# Patient Record
Sex: MALE | Race: BLACK OR AFRICAN AMERICAN | NOT HISPANIC OR LATINO | Employment: FULL TIME | URBAN - METROPOLITAN AREA
[De-identification: names, ages, dates, MRNs, and addresses within clinical notes are randomized per-mention and may not be internally consistent; named-entity substitution may affect disease eponyms.]

---
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
## 2022-07-26 ENCOUNTER — HOSPITAL ENCOUNTER (EMERGENCY)
Facility: CLINIC | Age: 22
Discharge: HOME | End: 2022-07-26
Attending: EMERGENCY MEDICINE | Admitting: EMERGENCY MEDICINE
Payer: COMMERCIAL

## 2022-07-26 VITALS
BODY MASS INDEX: 21.97 KG/M2 | RESPIRATION RATE: 18 BRPM | OXYGEN SATURATION: 100 % | DIASTOLIC BLOOD PRESSURE: 54 MMHG | SYSTOLIC BLOOD PRESSURE: 111 MMHG | HEART RATE: 116 BPM | HEIGHT: 73 IN | TEMPERATURE: 100.8 F

## 2022-07-26 DIAGNOSIS — U07.1 INFECTION DUE TO 2019 NOVEL CORONAVIRUS: ICD-10-CM

## 2022-07-26 LAB
ANION GAP SERPL CALCULATED.3IONS-SCNC: 9 MMOL/L (ref 3–14)
BUN SERPL-MCNC: 8 MG/DL (ref 7–30)
CALCIUM SERPL-MCNC: 9.5 MG/DL (ref 8.5–10.1)
CHLORIDE BLD-SCNC: 101 MMOL/L (ref 94–109)
CO2 SERPL-SCNC: 27 MMOL/L (ref 20–32)
CREAT SERPL-MCNC: 0.88 MG/DL (ref 0.66–1.25)
ERYTHROCYTE [DISTWIDTH] IN BLOOD BY AUTOMATED COUNT: 12.3 % (ref 10–15)
GFR SERPL CREATININE-BSD FRML MDRD: >90 ML/MIN/1.73M2
GLUCOSE BLD-MCNC: 103 MG/DL (ref 70–99)
HCT VFR BLD AUTO: 46.9 % (ref 40–53)
HGB BLD-MCNC: 15.5 G/DL (ref 13.3–17.7)
MCH RBC QN AUTO: 29.9 PG (ref 26.5–33)
MCHC RBC AUTO-ENTMCNC: 33 G/DL (ref 31.5–36.5)
MCV RBC AUTO: 91 FL (ref 78–100)
PLATELET # BLD AUTO: 205 10E3/UL (ref 150–450)
POTASSIUM BLD-SCNC: 3.8 MMOL/L (ref 3.4–5.3)
RBC # BLD AUTO: 5.18 10E6/UL (ref 4.4–5.9)
SODIUM SERPL-SCNC: 137 MMOL/L (ref 133–144)
WBC # BLD AUTO: 11.8 10E3/UL (ref 4–11)

## 2022-07-26 PROCEDURE — 85027 COMPLETE CBC AUTOMATED: CPT | Performed by: EMERGENCY MEDICINE

## 2022-07-26 PROCEDURE — 99284 EMERGENCY DEPT VISIT MOD MDM: CPT | Mod: 25

## 2022-07-26 PROCEDURE — 96360 HYDRATION IV INFUSION INIT: CPT

## 2022-07-26 PROCEDURE — 36415 COLL VENOUS BLD VENIPUNCTURE: CPT | Performed by: EMERGENCY MEDICINE

## 2022-07-26 PROCEDURE — 250N000013 HC RX MED GY IP 250 OP 250 PS 637: Performed by: EMERGENCY MEDICINE

## 2022-07-26 PROCEDURE — 258N000003 HC RX IP 258 OP 636: Performed by: EMERGENCY MEDICINE

## 2022-07-26 PROCEDURE — 96361 HYDRATE IV INFUSION ADD-ON: CPT

## 2022-07-26 PROCEDURE — 80048 BASIC METABOLIC PNL TOTAL CA: CPT | Performed by: EMERGENCY MEDICINE

## 2022-07-26 RX ORDER — ACETAMINOPHEN 325 MG/1
975 TABLET ORAL ONCE
Status: DISCONTINUED | OUTPATIENT
Start: 2022-07-26 | End: 2022-07-26

## 2022-07-26 RX ORDER — IBUPROFEN 100 MG/5ML
600 SUSPENSION, ORAL (FINAL DOSE FORM) ORAL ONCE
Status: COMPLETED | OUTPATIENT
Start: 2022-07-26 | End: 2022-07-26

## 2022-07-26 RX ORDER — IBUPROFEN 600 MG/1
600 TABLET, FILM COATED ORAL ONCE
Status: DISCONTINUED | OUTPATIENT
Start: 2022-07-26 | End: 2022-07-26

## 2022-07-26 RX ADMIN — SODIUM CHLORIDE 1000 ML: 9 INJECTION, SOLUTION INTRAVENOUS at 18:53

## 2022-07-26 RX ADMIN — ACETAMINOPHEN 1000 MG: 325 SUSPENSION ORAL at 19:29

## 2022-07-26 RX ADMIN — IBUPROFEN 600 MG: 200 SUSPENSION ORAL at 19:30

## 2022-07-26 NOTE — LETTER
[facility] EMERGENCY DEPT  [address] [facility] [address]  [phone]      2022    Ace Chavira  [address] [id number] [phone] (home)     : 2000      To Whom it may concern:    Ace Chavira was seen in our Emergency Department today, 2022 for an in evaluation.  He tested positive for COVID and will have to isolate at home for 10 days starting from 22.    Sincerely,  ____________________  Magan Crespo MD

## 2022-07-26 NOTE — ED TRIAGE NOTES
Pt tested positive for covid today. PT reports headache, back pain, and itching in throat.     Triage Assessment     Row Name 07/26/22 7957       Triage Assessment (Adult)    Airway WDL WDL       Respiratory WDL    Respiratory WDL --  Denies Cp       Cardiac WDL    Cardiac WDL WDL       Peripheral/Neurovascular WDL    Peripheral Neurovascular WDL WDL       Cognitive/Neuro/Behavioral WDL    Cognitive/Neuro/Behavioral WDL WDL

## 2022-07-26 NOTE — ED PROVIDER NOTES
"  History     Chief Complaint:  Covid Concern       HPI   Ace Chavira is a 22 year old male no reported past medical history presenting for evaluation of 1 day sore throat and a positive home COVID test.  He reports body aches, mild headache but no chest pain, chest pressure or shortness of breath.  He works as a .    ROS:  Review of Systems  10 point ROS completed, negative except as indicated in the HPI.       Allergies:  No Known Allergies     Medications:    None reported    Past Medical History:    None reported    Past Surgical History:    None reported       Family History:    None reported      Social History:  Works as   [facility]     Physical Exam   Patient Vitals for the past 24 hrs:   BP Temp Temp src Pulse Resp SpO2 Height   07/26/22 1841 111/54 (!) 100.8  F (38.2  C) Temporal 116 18 100 % 1.854 m (6' 1\")        Physical Exam  Vital Signs :/54   Pulse 116   Temp (!) 100.8  F (38.2  C) (Temporal)   Resp 18   Ht 1.854 m (6' 1\")   SpO2 100%   BMI 21.97 kg/m     Constitutional: Alert, attentive, GCS 15   Eyes: EOM are normal, anicteric, conjugate gaze  CV: distal extremities warm, well perfused  Chest: Clear to auscultation bilaterally non-labored breathing on RA  GI:  non tender. No distension. No guarding or rebound.    Neurological: Alert, attentive, moving all extremities equally.   Skin: Skin is warm and dry.             Emergency Department Course   Laboratory:  Labs Ordered and Resulted from Time of ED Arrival to Time of ED Departure   CBC WITH PLATELETS - Abnormal       Result Value    WBC Count 11.8 (*)     RBC Count 5.18      Hemoglobin 15.5      Hematocrit 46.9      MCV 91      MCH 29.9      MCHC 33.0      RDW 12.3      Platelet Count 205     BASIC METABOLIC PANEL - Abnormal    Sodium 137      Potassium 3.8      Chloride 101      Carbon Dioxide (CO2) 27      Anion Gap 9      Urea Nitrogen 8      Creatinine 0.88      Calcium 9.5      " Glucose 103 (*)     GFR Estimate >90            Emergency Department Course:  Reviewed:  I reviewed nursing notes, vitals and past medical history    Interventions:  Medications   0.9% sodium chloride BOLUS (0 mLs Intravenous Stopped 22)   ibuprofen (ADVIL/MOTRIN) suspension 600 mg (600 mg Oral Given 22)   acetaminophen (TYLENOL) solution 1,000 mg (1,000 mg Oral Given 22)        Disposition:  The patient was discharged to home.     Impression & Plan      Medical Decision Makin-year-old male no significant past medical history though he reports an intolerance to swallowing pills presenting for 1 day of sore throat, myalgias, mild headache and a positive home COVID test.  He has not tried any medications due to is concerned about swallowing pills not due to a sore throat but due to inability to tolerate pills.  His exam and history are consistent with viral illness, given his home positive COVID test, do not see indication for PCR testing as he is not a candidate for monoclonal antibodies or paxlovid.  He was given IV fluids, liquid Tylenol and ibuprofen with significant improvement in his discomfort.  He has no breathlessness, sats of 100% on room air, no indication for chest imaging.  I recommended altering dose of Tylenol, ibuprofen at home be they packets or liquid.  Return precautions reviewed and she was discharged home.      Diagnosis:    ICD-10-CM    1. Infection due to 2019 novel coronavirus  U07.1         Magan Crespo MD  [employer]  12:04 AM 22        Magan Crespo MD  22 [phone]

## 2022-07-27 NOTE — DISCHARGE INSTRUCTIONS

## 2023-04-02 ENCOUNTER — HOSPITAL ENCOUNTER (EMERGENCY)
Facility: CLINIC | Age: 23
Discharge: HOME | End: 2023-04-02
Payer: COMMERCIAL

## 2023-04-02 ENCOUNTER — APPOINTMENT (OUTPATIENT)
Dept: GENERAL RADIOLOGY | Facility: CLINIC | Age: 23
End: 2023-04-02
Payer: COMMERCIAL

## 2023-04-02 VITALS
OXYGEN SATURATION: 100 % | HEART RATE: 70 BPM | WEIGHT: 160 LBS | SYSTOLIC BLOOD PRESSURE: 113 MMHG | BODY MASS INDEX: 21.11 KG/M2 | RESPIRATION RATE: 18 BRPM | TEMPERATURE: 98.2 F | DIASTOLIC BLOOD PRESSURE: 62 MMHG

## 2023-04-02 DIAGNOSIS — R07.9 CHEST PAIN, UNSPECIFIED TYPE: ICD-10-CM

## 2023-04-02 LAB
ANION GAP SERPL CALCULATED.3IONS-SCNC: 10 MMOL/L (ref 7–15)
ATRIAL RATE - MUSE: 71 BPM
BASOPHILS # BLD MANUAL: 0.2 10E3/UL (ref 0–0.2)
BASOPHILS NFR BLD MANUAL: 3 %
BUN SERPL-MCNC: 10.3 MG/DL (ref 6–20)
CALCIUM SERPL-MCNC: 9.3 MG/DL (ref 8.6–10)
CHLORIDE SERPL-SCNC: 105 MMOL/L (ref 98–107)
CREAT SERPL-MCNC: 0.97 MG/DL (ref 0.67–1.17)
DEPRECATED HCO3 PLAS-SCNC: 26 MMOL/L (ref 22–29)
DIASTOLIC BLOOD PRESSURE - MUSE: NORMAL MMHG
EOSINOPHIL # BLD MANUAL: 0.6 10E3/UL (ref 0–0.7)
EOSINOPHIL NFR BLD MANUAL: 7 %
ERYTHROCYTE [DISTWIDTH] IN BLOOD BY AUTOMATED COUNT: 12 % (ref 10–15)
GFR SERPL CREATININE-BSD FRML MDRD: >90 ML/MIN/1.73M2
GLUCOSE SERPL-MCNC: 102 MG/DL (ref 70–99)
HCT VFR BLD AUTO: 46.6 % (ref 40–53)
HGB BLD-MCNC: 15.6 G/DL (ref 13.3–17.7)
INTERPRETATION ECG - MUSE: NORMAL
LYMPHOCYTES # BLD MANUAL: 2.3 10E3/UL (ref 0.8–5.3)
LYMPHOCYTES NFR BLD MANUAL: 29 %
MCH RBC QN AUTO: 29.9 PG (ref 26.5–33)
MCHC RBC AUTO-ENTMCNC: 33.5 G/DL (ref 31.5–36.5)
MCV RBC AUTO: 89 FL (ref 78–100)
MONOCYTES # BLD MANUAL: 0.6 10E3/UL (ref 0–1.3)
MONOCYTES NFR BLD MANUAL: 7 %
NEUTROPHILS # BLD MANUAL: 4.3 10E3/UL (ref 1.6–8.3)
NEUTROPHILS NFR BLD MANUAL: 54 %
P AXIS - MUSE: 61 DEGREES
PLAT MORPH BLD: ABNORMAL
PLATELET # BLD AUTO: 286 10E3/UL (ref 150–450)
POTASSIUM SERPL-SCNC: 3.8 MMOL/L (ref 3.4–5.3)
PR INTERVAL - MUSE: 152 MS
QRS DURATION - MUSE: 74 MS
QT - MUSE: 364 MS
QTC - MUSE: 395 MS
R AXIS - MUSE: 33 DEGREES
RBC # BLD AUTO: 5.22 10E6/UL (ref 4.4–5.9)
RBC MORPH BLD: ABNORMAL
SODIUM SERPL-SCNC: 141 MMOL/L (ref 136–145)
SYSTOLIC BLOOD PRESSURE - MUSE: NORMAL MMHG
T AXIS - MUSE: -9 DEGREES
TROPONIN T SERPL HS-MCNC: 7 NG/L
TROPONIN T SERPL HS-MCNC: 8 NG/L
VARIANT LYMPHS BLD QL SMEAR: PRESENT
VENTRICULAR RATE- MUSE: 71 BPM
WBC # BLD AUTO: 7.9 10E3/UL (ref 4–11)

## 2023-04-02 PROCEDURE — 80048 BASIC METABOLIC PNL TOTAL CA: CPT

## 2023-04-02 PROCEDURE — 71046 X-RAY EXAM CHEST 2 VIEWS: CPT

## 2023-04-02 PROCEDURE — 250N000009 HC RX 250: Performed by: EMERGENCY MEDICINE

## 2023-04-02 PROCEDURE — 250N000013 HC RX MED GY IP 250 OP 250 PS 637: Performed by: EMERGENCY MEDICINE

## 2023-04-02 PROCEDURE — 99285 EMERGENCY DEPT VISIT HI MDM: CPT | Mod: 25

## 2023-04-02 PROCEDURE — 84484 ASSAY OF TROPONIN QUANT: CPT | Mod: 91

## 2023-04-02 PROCEDURE — 36415 COLL VENOUS BLD VENIPUNCTURE: CPT

## 2023-04-02 PROCEDURE — 85007 BL SMEAR W/DIFF WBC COUNT: CPT | Performed by: EMERGENCY MEDICINE

## 2023-04-02 PROCEDURE — 93005 ELECTROCARDIOGRAM TRACING: CPT

## 2023-04-02 PROCEDURE — 84484 ASSAY OF TROPONIN QUANT: CPT | Performed by: EMERGENCY MEDICINE

## 2023-04-02 PROCEDURE — 36415 COLL VENOUS BLD VENIPUNCTURE: CPT | Performed by: EMERGENCY MEDICINE

## 2023-04-02 PROCEDURE — 85027 COMPLETE CBC AUTOMATED: CPT | Performed by: EMERGENCY MEDICINE

## 2023-04-02 PROCEDURE — 80048 BASIC METABOLIC PNL TOTAL CA: CPT | Performed by: EMERGENCY MEDICINE

## 2023-04-02 PROCEDURE — 85027 COMPLETE CBC AUTOMATED: CPT

## 2023-04-02 PROCEDURE — 84484 ASSAY OF TROPONIN QUANT: CPT

## 2023-04-02 PROCEDURE — 85007 BL SMEAR W/DIFF WBC COUNT: CPT

## 2023-04-02 RX ADMIN — LIDOCAINE HYDROCHLORIDE 30 ML: 20 SOLUTION ORAL; TOPICAL at 12:21

## 2023-04-02 ASSESSMENT — ENCOUNTER SYMPTOMS
ARTHRALGIAS: 0
SORE THROAT: 1
DIAPHORESIS: 0
NECK PAIN: 0
SHORTNESS OF BREATH: 0
NAUSEA: 0
BACK PAIN: 0
FEVER: 0
COUGH: 0
HEADACHES: 0
ABDOMINAL PAIN: 0
VOMITING: 0

## 2023-04-02 ASSESSMENT — ACTIVITIES OF DAILY LIVING (ADL): ADLS_ACUITY_SCORE: 35

## 2023-04-02 NOTE — ED PROVIDER NOTES
"    History     Chief Complaint:  Chest Pain       HPI   Ace Chavira is an otherwise healthy 22 year old male with a history of acid reflux who presents with chest pain. Earlier this morning at approximately 10am, he woke up with chest pressure that radiated to his his throat. The pain has been intermittent since then, and each episode lasts about 40 to 45 seconds. Also, it worsens whenever he lays on his side. He denies that the pain not radiated to his back, shoulders, or arms. He longer has any pain at bedside after taking a GI cocktail, but his neck \"now feels hot\". He has been dealing with some acid reflux lately, and has been taking an unspecified over-the-counter acid  for it. He has not taken this medication yet today. He does not have a history of blood clots or anxiety. He does not have any pertinent past family history. He does not currently take any prescription medications. He denies recent long distance travel, surgeries, or any other prolonged periods of immobilization. He recently quit smoking several months ago, but denies alcohol or drug use. He further denies abdominal pain, nausea, vomiting, diaphoresis, shortness of breath, cough, fevers, leg swelling, headaches, neck pain, or any other cold symptoms.  No recent trauma or new work outs he can recall that would cause discomfort.    Independent Historian: None     Review of External Notes: I reviewed the patients past note from 7/26/2022 where he tested positive for Covid-19. No other ED or Urgent care visit noted since then.     ROS:  Review of Systems   Constitutional: Negative for diaphoresis and fever.   HENT: Positive for sore throat.    Respiratory: Negative for cough and shortness of breath.    Cardiovascular: Positive for chest pain. Negative for leg swelling.   Gastrointestinal: Negative for abdominal pain, nausea and vomiting.   Musculoskeletal: Negative for arthralgias, back pain and neck pain.   Neurological: Negative for " headaches.   All other systems reviewed and are negative.    Allergies:  No Known Allergies     Medications:    The patient denies current use of medications.     Past Medical History:    The patient denies pertinent past medical history.    Social History:  Denies smoking or smokeless tobacco use   PCP: [facility]  The patient presents to the ED alone via private vehicle       Physical Exam     Patient Vitals for the past 24 hrs:   BP Temp Temp src Pulse Resp SpO2 Weight   04/02/23 1210 113/62 98.2  F (36.8  C) Temporal 70 18 100 % 72.6 kg (160 lb)        Physical Exam  General: Nontoxic appearing young adult male sitting on gurney.  HENT:   Head: Atraumatic.  Mouth/Throat: Oropharynx clear and moist. Uvula midline. No tonsillary exudate or swelling.  Eyes: Conjunctive and EOM normal. PERRLA.  Neck: Normal ROM. No rigidity.  CV: Regular rate and rhythm. Normal S1, S2. No appreciable murmurs. No reproducible tenderness.  Resp: Lungs clear to auscultation bilaterally. Normal respiratory effort. No stridor or cough observed.  GI:  Abdomen soft, non distended and nontender. No rebound or guarding.  MSK: Normal range of motion.  Skin: Warm and dry.  Neuro: Awake, alert, oriented x 3.  Psych: Normal mood and affect.    Emergency Department Course   ECG  ECG taken at 1205, ECG read at 1210  Sinus rhythm with marked sinus arrhythmia   Nonspecific T wave abnormality   Abnormal ECG   Rate 71 bpm. PA interval 152 ms. QRS duration 74 ms. QT/QTc 364/395 ms. P-R-T axes 61 33 -9.    Imaging:  Chest XR,  PA & LAT  Final Result  IMPRESSION: Negative chest.    Report per radiology    Laboratory:  Labs Ordered and Resulted from Time of ED Arrival to Time of ED Departure   BASIC METABOLIC PANEL - Abnormal       Result Value    Sodium 141      Potassium 3.8      Chloride 105      Carbon Dioxide (CO2) 26      Anion Gap 10      Urea Nitrogen 10.3      Creatinine 0.97      Calcium 9.3      Glucose 102 (*)     GFR  Estimate >90     DIFFERENTIAL - Abnormal    % Neutrophils 54      % Lymphocytes 29      % Monocytes 7      % Eosinophils 7      % Basophils 3      Absolute Neutrophils 4.3      Absolute Lymphocytes 2.3      Absolute Monocytes 0.6      Absolute Eosinophils 0.6      Absolute Basophils 0.2      RBC Morphology Confirmed RBC Indices      Platelet Assessment        Value: Automated Count Confirmed. Platelet morphology is normal.    Reactive Lymphocytes Present (*)    TROPONIN T, HIGH SENSITIVITY - Normal    Troponin T, High Sensitivity 8     CBC WITH PLATELETS AND DIFFERENTIAL - Normal    WBC Count 7.9      RBC Count 5.22      Hemoglobin 15.6      Hematocrit 46.6      MCV 89      MCH 29.9      MCHC 33.5      RDW 12.0      Platelet Count 286     TROPONIN T, HIGH SENSITIVITY - Normal    Troponin T, High Sensitivity 7        Emergency Department Course & Assessments:    Interventions:  1221 GI Cocktail 15 ml PO    Independent Interpretation (X-rays, CTs, rhythm strip):  1610 I reviewed the chest x-ray and did not see any concerning findings.     Consultations/Discussion of Management or Tests:  N/A    Social Determinants of Health affecting care:  None      Assessments:  1527 I obtained history and examined the patient as noted above.  1640    I rechecked the patient and explained findings. I discussed plan for discharge home.    Disposition:  The patient was discharged to home.     Impression & Plan    Medical Decision Making:  Willie is a remarkably pleasant 22-year-old otherwise healthy male who came into the emergency department for evaluation of chest discomfort that started this morning at around 10am when he woke up.  On arrival here vital signs were normal.  Exam unremarkable per above.  EKG with no acute ischemic changes or signs of HOCM and serial troponins not elevated and flat from 1 reading to the next. No concerning personal or family cardiac history. He has a low risk heart score per below:    HEART  SCORE:  History: Slightly suspicious  EKG: Normal  Age: 23yo  Risk factors: None  Initial troponin: Not elevated  Total: 0 points  Low risk     I obtained basic labs.  CBC showed no evidence for anemia.  There was no leukocytosis and the patient was afebrile making infectious etiology unlikely.  BMP showed no acute electrolyte derangement and he had normal kidney function.  I did not pursue dimer testing, as the patient was PERC negative.  Chest x-ray showed no acute abnormalities including no infiltrate, pleural effusion, pneumothorax, widened mediastinum or cardiomegaly.    Patient felt somewhat improved with a GI cocktail.  With his story of chest discomfort radiating up into his throat, I feel this is likely reflux.  I will prescribe him a trial of omeprazole.  He does not currently have established primary care, so I provided him with a referral and he will get a call from [facility] to make an appointment for recheck.  In the meantime we discussed return precautions including development of a fever, worsening chest pain, shortness of breath, or any other new or concerning problems.  Patient was discharged home with a clear plan.    Diagnosis:    ICD-10-CM    1. Chest pain, unspecified type  R07.9 Primary Care Referral           Discharge Medications:  New Prescriptions    OMEPRAZOLE (PRILOSEC) 20 MG DR CAPSULE    Take 2 capsules (40 mg) by mouth daily for 30 days          Scribe Disclosure:  I, Roel Sawyer, am serving as a scribe at 3:54 PM on 4/2/2023 to document services personally performed by Martha Dow PA-C based on my observations and the provider's statements to me.    4/2/2023   Martha Dow PA-C Dewing, Jennifer C, PA-C  04/02/23 3050

## 2023-04-23 ENCOUNTER — HEALTH MAINTENANCE LETTER (OUTPATIENT)
Age: 23
End: 2023-04-23

## 2023-09-30 ENCOUNTER — OFFICE VISIT (OUTPATIENT)
Dept: FAMILY MEDICINE | Facility: CLINIC | Age: 23
End: 2023-09-30
Payer: COMMERCIAL

## 2023-09-30 VITALS
OXYGEN SATURATION: 98 % | HEART RATE: 69 BPM | DIASTOLIC BLOOD PRESSURE: 80 MMHG | SYSTOLIC BLOOD PRESSURE: 114 MMHG | TEMPERATURE: 97.3 F

## 2023-09-30 DIAGNOSIS — R10.13 ABDOMINAL PAIN, EPIGASTRIC: Primary | ICD-10-CM

## 2023-09-30 DIAGNOSIS — R11.2 NAUSEA AND VOMITING, UNSPECIFIED VOMITING TYPE: ICD-10-CM

## 2023-09-30 LAB
ALBUMIN SERPL BCG-MCNC: 5 G/DL (ref 3.5–5.2)
ALBUMIN UR-MCNC: 100 MG/DL
ALP SERPL-CCNC: 106 U/L (ref 40–129)
ALT SERPL W P-5'-P-CCNC: 19 U/L (ref 0–70)
AMORPH CRY #/AREA URNS HPF: ABNORMAL /HPF
ANION GAP SERPL CALCULATED.3IONS-SCNC: 10 MMOL/L (ref 7–15)
APPEARANCE UR: ABNORMAL
AST SERPL W P-5'-P-CCNC: 28 U/L (ref 0–45)
BACTERIA #/AREA URNS HPF: ABNORMAL /HPF
BASOPHILS # BLD AUTO: 0.1 10E3/UL (ref 0–0.2)
BASOPHILS NFR BLD AUTO: 1 %
BILIRUB SERPL-MCNC: 0.5 MG/DL
BILIRUB UR QL STRIP: ABNORMAL
BUN SERPL-MCNC: 13.2 MG/DL (ref 6–20)
CALCIUM SERPL-MCNC: 10 MG/DL (ref 8.6–10)
CHLORIDE SERPL-SCNC: 105 MMOL/L (ref 98–107)
COLOR UR AUTO: ABNORMAL
CREAT SERPL-MCNC: 1.05 MG/DL (ref 0.67–1.17)
CRP SERPL-MCNC: <3 MG/L
DEPRECATED HCO3 PLAS-SCNC: 27 MMOL/L (ref 22–29)
EGFRCR SERPLBLD CKD-EPI 2021: >90 ML/MIN/1.73M2
EOSINOPHIL # BLD AUTO: 0.4 10E3/UL (ref 0–0.7)
EOSINOPHIL NFR BLD AUTO: 8 %
ERYTHROCYTE [DISTWIDTH] IN BLOOD BY AUTOMATED COUNT: 12.8 % (ref 10–15)
GLUCOSE SERPL-MCNC: 100 MG/DL (ref 70–99)
GLUCOSE UR STRIP-MCNC: NEGATIVE MG/DL
HCT VFR BLD AUTO: 47.1 % (ref 40–53)
HGB BLD-MCNC: 15.9 G/DL (ref 13.3–17.7)
HGB UR QL STRIP: NEGATIVE
IMM GRANULOCYTES # BLD: 0 10E3/UL
IMM GRANULOCYTES NFR BLD: 0 %
KETONES UR STRIP-MCNC: 15 MG/DL
LEUKOCYTE ESTERASE UR QL STRIP: NEGATIVE
LIPASE SERPL-CCNC: 26 U/L (ref 13–60)
LYMPHOCYTES # BLD AUTO: 1.6 10E3/UL (ref 0.8–5.3)
LYMPHOCYTES NFR BLD AUTO: 29 %
MCH RBC QN AUTO: 30.1 PG (ref 26.5–33)
MCHC RBC AUTO-ENTMCNC: 33.8 G/DL (ref 31.5–36.5)
MCV RBC AUTO: 89 FL (ref 78–100)
MONOCYTES # BLD AUTO: 0.4 10E3/UL (ref 0–1.3)
MONOCYTES NFR BLD AUTO: 7 %
NEUTROPHILS # BLD AUTO: 3 10E3/UL (ref 1.6–8.3)
NEUTROPHILS NFR BLD AUTO: 55 %
NITRATE UR QL: NEGATIVE
NRBC # BLD AUTO: 0 10E3/UL
NRBC BLD AUTO-RTO: 0 /100
PH UR STRIP: 6 [PH] (ref 5–7)
PLATELET # BLD AUTO: 255 10E3/UL (ref 150–450)
POTASSIUM SERPL-SCNC: 4.1 MMOL/L (ref 3.4–5.3)
PROT SERPL-MCNC: 7.7 G/DL (ref 6.4–8.3)
RBC # BLD AUTO: 5.29 10E6/UL (ref 4.4–5.9)
RBC #/AREA URNS AUTO: ABNORMAL /HPF
SODIUM SERPL-SCNC: 142 MMOL/L (ref 135–145)
SP GR UR STRIP: >=1.03 (ref 1–1.03)
UROBILINOGEN UR STRIP-ACNC: 1 E.U./DL
WBC # BLD AUTO: 5.4 10E3/UL (ref 4–11)
WBC #/AREA URNS AUTO: ABNORMAL /HPF

## 2023-09-30 PROCEDURE — 80053 COMPREHEN METABOLIC PANEL: CPT | Performed by: FAMILY MEDICINE

## 2023-09-30 PROCEDURE — 99204 OFFICE O/P NEW MOD 45 MIN: CPT | Performed by: FAMILY MEDICINE

## 2023-09-30 PROCEDURE — 81001 URINALYSIS AUTO W/SCOPE: CPT

## 2023-09-30 PROCEDURE — 86140 C-REACTIVE PROTEIN: CPT | Performed by: FAMILY MEDICINE

## 2023-09-30 PROCEDURE — 36415 COLL VENOUS BLD VENIPUNCTURE: CPT

## 2023-09-30 PROCEDURE — 83690 ASSAY OF LIPASE: CPT | Performed by: FAMILY MEDICINE

## 2023-09-30 PROCEDURE — 85025 COMPLETE CBC W/AUTO DIFF WBC: CPT | Performed by: FAMILY MEDICINE

## 2023-09-30 RX ORDER — ONDANSETRON 4 MG/1
4 TABLET, ORALLY DISINTEGRATING ORAL EVERY 8 HOURS PRN
Qty: 20 TABLET | Refills: 0 | Status: SHIPPED | OUTPATIENT
Start: 2023-09-30 | End: 2024-04-02

## 2023-09-30 NOTE — PROGRESS NOTES
URGENT CARE VISIT:    ASSESSMENT AND PLAN:      ICD-10-CM    1. Abdominal pain, epigastric  R10.13 CBC with Platelets & Differential     Comprehensive metabolic panel     CRP inflammation     UA Macroscopic with reflex to Microscopic and Culture     Lipase     ondansetron (ZOFRAN ODT) 4 MG ODT tab     CBC with Platelets & Differential     Comprehensive metabolic panel     CRP inflammation     UA Macroscopic with reflex to Microscopic and Culture     Lipase     UA Microscopic with Reflex to Culture      2. Nausea and vomiting, unspecified vomiting type  R11.2 CBC with Platelets & Differential     Comprehensive metabolic panel     CRP inflammation     UA Macroscopic with reflex to Microscopic and Culture     Lipase     ondansetron (ZOFRAN ODT) 4 MG ODT tab     CBC with Platelets & Differential     Comprehensive metabolic panel     CRP inflammation     UA Macroscopic with reflex to Microscopic and Culture     Lipase     UA Microscopic with Reflex to Culture            Differential Diagnosis include but not limited to  Constipation, GERD/Ulcer, UTI, Pyelonephritis, Viral Gastroenteritis, etc.  Lab work including CBC, CMP, CRP, UA, and lipase do not show significant abnormality. Prescription for Zofran given to aid with nausea and vomiting symptoms.  I have recommended trial over-the-counter PPI to aid with possible GERD symptoms.  Supportive and OTC measures outlined in AVS    Reviewed alarm signs and symptoms needing ED evaluation.     Follow up with primary care provider with any problems, questions or concerns or if symptoms worsen or fail to improve. Patient verbalized understanding and is agreeable to plan. The patient was discharged ambulatory and in stable condition.    Chief Complaint   Patient presents with    Urgent Care    Abdominal Pain     3-4 days with upper abdominal pain. Pt has not been able to eat without having abdominal cramping pain. Per mother- pt vomit at the parking lot. Pt feels more nausea than  pain in the last 2 days     SUBJECTIVE  HPI:  Ace Chavira is a 23 year old male who presents with abdominal pain.  Associated symptoms include nausea/vomiting x1-2 episodes a day with limited appetite.  Patient reports diet consists of fast food.  Pain is located in the epigastric area without radiation. The pain is characterized as cramping and at worst is a level 8 on a scale of 1-10.  Pain has been present for 3 - 4 day(s) and is fluctuating.  EXACERBATING FACTORS: nothing  RELIEVING FACTORS: remaining still.      Denies fever/chills, abdominal surgical history, GERD history, melena, diarrhea, hematochezia, headache, and urinary symptoms    OTC: Maalox    LBM: 9/30/23       History reviewed. No pertinent past medical history.  Current Outpatient Medications   Medication Sig Dispense Refill    ondansetron (ZOFRAN ODT) 4 MG ODT tab Take 1 tablet (4 mg) by mouth every 8 hours as needed for nausea or vomiting 20 tablet 0     Social History     Tobacco Use    Smoking status: Never     Passive exposure: Never    Smokeless tobacco: Never   Substance Use Topics    Alcohol use: Not on file     No Known Allergies    ROS:  Review of systems negative except as stated above.    OBJECTIVE:  /80   Pulse 69   Temp 97.3  F (36.3  C) (Tympanic)   SpO2 98%     GENERAL APPEARANCE: healthy, alert and no distress  EYES: EOMI,  PERRL, conjunctiva clear  HENT: ear canals and TM's normal.  Nose and mouth without ulcers, erythema or lesions  NECK: supple, nontender, no lymphadenopathy  RESP: lungs clear to auscultation - no rales, rhonchi or wheezes  CV: regular rates and rhythm, normal S1 S2, no murmur noted  ABDOMEN:  nontender to palpation in all 4 quadrants. No organomegaly or masses.  SKIN: no suspicious lesions or rashes      Laboratory and Diagnostics    Results for orders placed or performed in visit on 09/30/23 (from the past 24 hour(s))   CBC with Platelets & Differential    Narrative    The following orders were  created for panel order CBC with Platelets & Differential.  Procedure                               Abnormality         Status                     ---------                               -----------         ------                     CBC with platelets and d...[[id number]]                      Final result                 Please view results for these tests on the individual orders.   Comprehensive metabolic panel   Result Value Ref Range    Sodium 142 135 - 145 mmol/L    Potassium 4.1 3.4 - 5.3 mmol/L    Carbon Dioxide (CO2) 27 22 - 29 mmol/L    Anion Gap 10 7 - 15 mmol/L    Urea Nitrogen 13.2 6.0 - 20.0 mg/dL    Creatinine 1.05 0.67 - 1.17 mg/dL    GFR Estimate >90 >60 mL/min/1.73m2    Calcium 10.0 8.6 - 10.0 mg/dL    Chloride 105 98 - 107 mmol/L    Glucose 100 (H) 70 - 99 mg/dL    Alkaline Phosphatase 106 40 - 129 U/L    AST 28 0 - 45 U/L    ALT 19 0 - 70 U/L    Protein Total 7.7 6.4 - 8.3 g/dL    Albumin 5.0 3.5 - 5.2 g/dL    Bilirubin Total 0.5 <=1.2 mg/dL   CRP inflammation   Result Value Ref Range    CRP Inflammation <3.00 <5.00 mg/L   UA Macroscopic with reflex to Microscopic and Culture    Specimen: Urine, Clean Catch   Result Value Ref Range    Color Urine Keyla (A) Colorless, Straw, Light Yellow, Yellow    Appearance Urine Slightly Cloudy (A) Clear    Glucose Urine Negative Negative mg/dL    Bilirubin Urine Small (A) Negative    Ketones Urine 15 (A) Negative mg/dL    Specific Gravity Urine >=1.030 1.003 - 1.035    Blood Urine Negative Negative    pH Urine 6.0 5.0 - 7.0    Protein Albumin Urine 100 (A) Negative mg/dL    Urobilinogen Urine 1.0 0.2, 1.0 E.U./dL    Nitrite Urine Negative Negative    Leukocyte Esterase Urine Negative Negative   Lipase   Result Value Ref Range    Lipase 26 13 - 60 U/L   CBC with platelets and differential   Result Value Ref Range    WBC Count 5.4 4.0 - 11.0 10e3/uL    RBC Count 5.29 4.40 - 5.90 10e6/uL    Hemoglobin 15.9 13.3 - 17.7 g/dL    Hematocrit 47.1 40.0 - 53.0 %    MCV  89 78 - 100 fL    MCH 30.1 26.5 - 33.0 pg    MCHC 33.8 31.5 - 36.5 g/dL    RDW 12.8 10.0 - 15.0 %    Platelet Count 255 150 - 450 10e3/uL    % Neutrophils 55 %    % Lymphocytes 29 %    % Monocytes 7 %    % Eosinophils 8 %    % Basophils 1 %    % Immature Granulocytes 0 %    NRBCs per 100 WBC 0 <1 /100    Absolute Neutrophils 3.0 1.6 - 8.3 10e3/uL    Absolute Lymphocytes 1.6 0.8 - 5.3 10e3/uL    Absolute Monocytes 0.4 0.0 - 1.3 10e3/uL    Absolute Eosinophils 0.4 0.0 - 0.7 10e3/uL    Absolute Basophils 0.1 0.0 - 0.2 10e3/uL    Absolute Immature Granulocytes 0.0 <=0.4 10e3/uL    Absolute NRBCs 0.0 10e3/uL   UA Microscopic with Reflex to Culture   Result Value Ref Range    Bacteria Urine Many (A) None Seen /HPF    RBC Urine 0-2 0-2 /HPF /HPF    WBC Urine 0-5 0-5 /HPF /HPF    Amorphous Crystals Urine Many (A) None Seen /HPF    Narrative    Urine Culture not indicated

## 2023-09-30 NOTE — PATIENT INSTRUCTIONS
Lab work will come back today and I will call you back with results    Zofran to helo with nausea and vomiting    Ease digestive problems  Put fluid back into your body. Take frequent sips of clear liquids such as water or broth. Do not drink beverages with a lot of sugar in them, such as juices and sodas. These can make diarrhea worse. Older children and adults can drink sports drinks.  Patient will need to drink at least 1.5-2 liters of fluids daily for adults and 1-1.5 liters for children. If vomiting and not tolerating liquids for more than 24 hrs, please go to your nearest emergency department for IV fluids and further treatment.   Maintain hydration by drinking small amounts of clear fluids frequently, then soft diet, and then advance diet as tolerated. May use any prescribed imodium if desired for any diarrhea. Call if symptoms worsen, high fever, severe weakness or fainting, increased abdominal pain, blood in stool or vomit, or failure to improve in 2-3 days.   As your appetite returns, you can resume your normal diet. Ask your doctor whether there are any foods you should avoid.  When to seek medical care  When you first notice symptoms, ask your health care provider if antiviral medications are appropriate. Antibiotics should not be taken for colds or flu. Also, call your doctor if you have any of the following symptoms or if you aren t feeling better after 7 days:  Shortness of breath  Pain or pressure in the chest or abdomen  Worsening symptoms, especially after a period of improvement  Fever that doesn t go down with medication  Sudden dizziness or confusion  Severe or continued vomiting  Signs of dehydration, including extreme thirst, dark urine, infrequent urination, dry mouth  Spotted, red, or very sore throat        Go to the ER:        You passed out (lost consciousness).     You pass maroon or very bloody stools.     You vomit blood or what looks like coffee grounds.     You have severe belly  pain.   Call your doctor now or seek immediate medical care if:    Your pain gets worse, especially if it becomes focused in one area of your belly.     You have a new or higher fever.     Your stools are black and look like tar, or they have streaks of blood.     You have unexpected vaginal bleeding.     You have symptoms of a urinary tract infection. These may include:  Pain when you urinate.  Urinating more often than usual.  Blood in your urine.     You are dizzy or lightheaded, or you feel like you may faint.   Watch closely for changes in your health, and be sure to contact your doctor if:    You are not getting better as expected.

## 2024-02-11 ENCOUNTER — APPOINTMENT (OUTPATIENT)
Dept: ULTRASOUND IMAGING | Facility: CLINIC | Age: 24
End: 2024-02-11
Attending: EMERGENCY MEDICINE
Payer: COMMERCIAL

## 2024-02-11 ENCOUNTER — HOSPITAL ENCOUNTER (EMERGENCY)
Facility: CLINIC | Age: 24
Discharge: HOME | End: 2024-02-11
Attending: EMERGENCY MEDICINE | Admitting: EMERGENCY MEDICINE
Payer: COMMERCIAL

## 2024-02-11 VITALS
SYSTOLIC BLOOD PRESSURE: 122 MMHG | WEIGHT: 146 LBS | BODY MASS INDEX: 19.35 KG/M2 | RESPIRATION RATE: 17 BRPM | HEIGHT: 73 IN | TEMPERATURE: 97.8 F | DIASTOLIC BLOOD PRESSURE: 82 MMHG | OXYGEN SATURATION: 99 % | HEART RATE: 72 BPM

## 2024-02-11 DIAGNOSIS — K21.9 GASTROESOPHAGEAL REFLUX DISEASE WITHOUT ESOPHAGITIS: ICD-10-CM

## 2024-02-11 DIAGNOSIS — N45.1 EPIDIDYMITIS: ICD-10-CM

## 2024-02-11 LAB
ALBUMIN UR-MCNC: 100 MG/DL
APPEARANCE UR: CLEAR
BILIRUB UR QL STRIP: NEGATIVE
COLOR UR AUTO: YELLOW
GLUCOSE UR STRIP-MCNC: NEGATIVE MG/DL
HGB UR QL STRIP: NEGATIVE
KETONES UR STRIP-MCNC: ABNORMAL MG/DL
LEUKOCYTE ESTERASE UR QL STRIP: NEGATIVE
MUCOUS THREADS #/AREA URNS LPF: PRESENT /LPF
NITRATE UR QL: NEGATIVE
PH UR STRIP: 5.5 [PH] (ref 5–7)
RBC URINE: 5 /HPF
SP GR UR STRIP: 1.03 (ref 1–1.03)
SPERM #/AREA URNS HPF: PRESENT /HPF
UROBILINOGEN UR STRIP-MCNC: <2 MG/DL
WBC URINE: 2 /HPF

## 2024-02-11 PROCEDURE — 87491 CHLMYD TRACH DNA AMP PROBE: CPT | Performed by: EMERGENCY MEDICINE

## 2024-02-11 PROCEDURE — 81001 URINALYSIS AUTO W/SCOPE: CPT | Performed by: EMERGENCY MEDICINE

## 2024-02-11 PROCEDURE — 76870 US EXAM SCROTUM: CPT

## 2024-02-11 PROCEDURE — 250N000011 HC RX IP 250 OP 636: Performed by: EMERGENCY MEDICINE

## 2024-02-11 PROCEDURE — 250N000009 HC RX 250: Performed by: EMERGENCY MEDICINE

## 2024-02-11 PROCEDURE — 99285 EMERGENCY DEPT VISIT HI MDM: CPT | Mod: 25

## 2024-02-11 PROCEDURE — 96372 THER/PROPH/DIAG INJ SC/IM: CPT | Performed by: EMERGENCY MEDICINE

## 2024-02-11 PROCEDURE — 250N000013 HC RX MED GY IP 250 OP 250 PS 637: Performed by: EMERGENCY MEDICINE

## 2024-02-11 RX ORDER — ONDANSETRON 4 MG/1
4 TABLET, ORALLY DISINTEGRATING ORAL EVERY 8 HOURS PRN
Qty: 10 TABLET | Refills: 0 | Status: SHIPPED | OUTPATIENT
Start: 2024-02-11 | End: 2024-02-14

## 2024-02-11 RX ORDER — ACETAMINOPHEN 325 MG/1
650 TABLET ORAL ONCE
Status: COMPLETED | OUTPATIENT
Start: 2024-02-11 | End: 2024-02-11

## 2024-02-11 RX ORDER — FAMOTIDINE 20 MG/1
20 TABLET, FILM COATED ORAL 2 TIMES DAILY
Qty: 30 TABLET | Refills: 0 | Status: SHIPPED | OUTPATIENT
Start: 2024-02-11 | End: 2024-04-02

## 2024-02-11 RX ORDER — FAMOTIDINE 10 MG
10 TABLET ORAL ONCE
Status: COMPLETED | OUTPATIENT
Start: 2024-02-11 | End: 2024-02-11

## 2024-02-11 RX ORDER — ONDANSETRON 4 MG/1
4 TABLET, ORALLY DISINTEGRATING ORAL ONCE
Status: COMPLETED | OUTPATIENT
Start: 2024-02-11 | End: 2024-02-11

## 2024-02-11 RX ORDER — DOXYCYCLINE 100 MG/1
100 CAPSULE ORAL 2 TIMES DAILY
Qty: 20 CAPSULE | Refills: 0 | Status: SHIPPED | OUTPATIENT
Start: 2024-02-11 | End: 2024-02-18 | Stop reason: SINTOL

## 2024-02-11 RX ADMIN — FAMOTIDINE 10 MG: 10 TABLET ORAL at 17:53

## 2024-02-11 RX ADMIN — ONDANSETRON 4 MG: 4 TABLET, ORALLY DISINTEGRATING ORAL at 17:39

## 2024-02-11 RX ADMIN — ACETAMINOPHEN 650 MG: 325 TABLET ORAL at 17:53

## 2024-02-11 RX ADMIN — LIDOCAINE HYDROCHLORIDE 500 MG: 10 INJECTION, SOLUTION EPIDURAL; INFILTRATION; INTRACAUDAL; PERINEURAL at 21:52

## 2024-02-11 ASSESSMENT — ACTIVITIES OF DAILY LIVING (ADL)
ADLS_ACUITY_SCORE: 35
ADLS_ACUITY_SCORE: 35

## 2024-02-11 NOTE — ED PROVIDER NOTES
EMERGENCY DEPARTMENT ENCOUNTER      NAME: Ace Chavira  AGE: 23 year old male  YOB: 2000  MRN: [id number]  EVALUATION DATE & TIME: No admission date for patient encounter.    PCP: [facility]    ED PROVIDER: Daniel Kraus M.D.      Chief Complaint   Patient presents with    Groin Swelling    Nausea         FINAL IMPRESSION:  Right epididymitis  GERD    ED COURSE & MEDICAL DECISION MAKING:    Pertinent Labs & Imaging studies reviewed. (See chart for details)  23 year old male presents to the Emergency Department for evaluation of groin pain and testicular pain.  Symptoms started last night worsened today.  Describes a severe achiness that worsens with sitting and improves with standing.  Localizes it at the right inguinal ring area.  Denies any urinary discharge or dysuria.  Reports no overexertion's or trauma.  Does work as a  and has been wearing a bulky belt which may be contributory.  Also reports being off of his Pepcid for the last 2 days and with this he started having some slight abdominal pain and vomiting.  Exam reveals well-nourished well-developed male in mild distress.  Abdomen is soft and nontender.  Patient is circumcised.  No penile discharge or lesions.  Testes are soft and nontender bilaterally.  Patient with marked tenderness at the inguinal ring on the right without evidence of hernia.  Could be simple musculoskeletal/strain discomfort.  Out of caution we will obtain testicular ultrasound.  Will also obtain urine analysis to assess for GC and chlamydia.  Patient treated symptomatically with oral Zofran and Pepcid given his nausea and vomiting.  Will also give Tylenol for discomfort.  Avoiding ibuprofen given his stomach upset.  Presently no indications for CT imaging or additional laboratory testing.. Patient appears non toxic with stable vitals signs. Overall exam is benign.    5:27 PM I met with the patient for the initial interview and  physical examination. Discussed plan for treatment and workup in the ED.    6:12 PM.  Urine analysis without evidence of infection.  Sperm noted.  At the conclusion of the encounter I discussed the results of all of the tests and the disposition. The questions were answered and return precautions provided. The patient or family acknowledged understanding and was agreeable with the care plan.   9:02 PM.  Ultrasound with hyperemia of the right epididymis consistent with epididymitis.  Results shared with patient.  Will proceed with Rocephin IM followed by outpatient doxycycline.    PPE: Provider wore paper mask and gloves.     MEDICATIONS GIVEN IN THE EMERGENCY:  Medications - No data to display    NEW PRESCRIPTIONS STARTED AT TODAY'S ER VISIT  Current Discharge Medication List        START taking these medications    Details   doxycycline hyclate (VIBRAMYCIN) 100 MG capsule Take 1 capsule (100 mg) by mouth 2 times daily  Qty: 20 capsule, Refills: 0      famotidine (PEPCID) 20 MG tablet Take 1 tablet (20 mg) by mouth 2 times daily  Qty: 30 tablet, Refills: 0      !! ondansetron (ZOFRAN ODT) 4 MG ODT tab Take 1 tablet (4 mg) by mouth every 8 hours as needed  Qty: 10 tablet, Refills: 0       !! - Potential duplicate medications found. Please discuss with provider.              =================================================================    HPI    Patient information was obtained from: patient     Use of Intrepreter: N/A       Ace CORINE Chavira is a 23 year old male with no pertinent history on file who presents to the ED for evaluation of testicular pain.  Patient reports an achiness in the right testicle which started last night and is worsening today.  Worsens with sitting down and improves with standing.  Actually localizes the discomfort at the right inguinal ring.  Denies any overexertion's or traumas.  No urethral discharge or dysuria.  No prior similar episodes.  Also reports she has been having trouble with  "nausea and vomiting after running out of his Pepcid a few days ago      REVIEW OF SYSTEMS   Constitutional:  Denies fever, chills  Respiratory:  Denies productive cough or increased work of breathing  Cardiovascular:  Denies chest pain, palpitations  GI: No change in bowel or bladder habits. Endorses aching  pain at the right inguinal ring.  Positive nausea and vomiting.  : Denies penile discharge. Endorses groin pain.  Musculoskeletal:  Denies any new muscle/joint swelling  Skin:  Denies rash   Neurologic:  Denies focal weakness  All systems negative except as marked.     PAST MEDICAL HISTORY:  No past medical history on file.    PAST SURGICAL HISTORY:  No past surgical history on file.      CURRENT MEDICATIONS:    No current facility-administered medications for this encounter.    Current Outpatient Medications:     ondansetron (ZOFRAN ODT) 4 MG ODT tab, Take 1 tablet (4 mg) by mouth every 8 hours as needed for nausea or vomiting, Disp: 20 tablet, Rfl: 0    ALLERGIES:  No Known Allergies    FAMILY HISTORY:  No family history on file.    SOCIAL HISTORY:   Social History     Socioeconomic History    Marital status: Single   Tobacco Use    Smoking status: Never     Passive exposure: Never    Smokeless tobacco: Never       VITALS:  Patient Vitals for the past 24 hrs:   BP Temp Temp src Pulse Resp SpO2 Height Weight   02/11/24 1722 122/82 97.8  F (36.6  C) Oral 72 17 99 % 1.854 m (6' 1\") 66.2 kg (146 lb)        PHYSICAL EXAM    Constitutional:  Awake, alert, in no apparent distress  HENT:  Normocephalic, Atraumatic. Bilateral external ears normal. Oropharynx moist. Nose normal. Neck- Normal range of motion with no guarding,Supple, No stridor.   Eyes:  PERRL, EOMI with no signs of entrapment, Conjunctiva normal, No discharge.   Respiratory:  Normal breath sounds, No respiratory distress, No wheezing.    Cardiovascular:  Normal heart rate, Normal rhythm, No appreciable rubs or gallops.   GI:  Soft, No tenderness, No " distension, No palpable masses  : Circumcised penis and testes nontender. Marked tenderness at the right inguinal ring, no sign of hernia.  Musculoskeletal:  Intact distal pulses, No edema. Good range of motion in all major joints. No tenderness to palpation or major deformities noted.  Integument:  Warm, Dry, No erythema, No rash.   Neurologic:  Alert & oriented, Normal motor function, Normal sensory function, No focal deficits noted.   Psychiatric:  Affect normal, Judgment normal, Mood normal.     LAB:  All pertinent labs reviewed and interpreted.     Results for orders placed or performed during the hospital encounter of 02/11/24   US Testicular & Scrotum w Doppler Ltd     Status: None    Narrative    EXAM: US TESTICULAR AND SCROTUM WITH DOPPLER LIMITED  LOCATION: [facility]  DATE: 2/11/2024    INDICATION: Right inguinal testicular tenderness    COMPARISON: None.    TECHNIQUE: Ultrasound of scrotum with color flow and spectral Doppler with waveform analysis performed.    FINDINGS:  RIGHT: Right testicle measures 3.8 x 1.9 x 2.8 cm. Normal testicle with no masses. Normal arterial duplex and normal color flow. Mild epididymal hyperemia. No hydrocele. No varicocele.    LEFT: Left testicle measures 4.2 x 2.0 x 3.0 cm. Normal testicle with no masses. There are couple of punctate calcifications. Normal arterial duplex and normal color flow. Tiny 3 mm epididymal cyst. Otherwise normal epididymis. Contrast hydrocele. No   varicocele.      Impression    IMPRESSION:  1.  Mild hyperemia of the right epididymis compatible with epididymitis.   UA with Microscopic reflex to Culture     Status: Abnormal    Specimen: Urine, Clean Catch   Result Value Ref Range    Color Urine Yellow Colorless, Straw, Light Yellow, Yellow    Appearance Urine Clear Clear    Glucose Urine Negative Negative mg/dL    Bilirubin Urine Negative Negative    Ketones Urine Trace (A) Negative mg/dL    Specific Gravity Urine 1.031  (H) 1.001 - 1.030    Blood Urine Negative Negative    pH Urine 5.5 5.0 - 7.0    Protein Albumin Urine 100 (A) Negative mg/dL    Urobilinogen Urine <2.0 <2.0 mg/dL    Nitrite Urine Negative Negative    Leukocyte Esterase Urine Negative Negative    Mucus Urine Present (A) None Seen /LPF    Sperm Urine Present (A) None Seen /HPF    RBC Urine 5 (H) <=2 /HPF    WBC Urine 2 <=5 /HPF    Narrative    Urine Culture not indicated        I, Josh Walton, am serving as a scribe to document services personally performed by Daniel Kraus MD, based on my observation and the provider's statements to me. I, Daniel Kraus MD attest that Josh Walton is acting in a scribe capacity, has observed my performance of the services and has documented them in accordance with my direction.    Daniel Kraus M.D.  Emergency Medicine  [facility] EMERGENCY ROOM     Daniel Kraus MD  02/11/24 3370

## 2024-02-11 NOTE — ED TRIAGE NOTES
Pt states he has had right sided testicular pain since yesterday. He states it is worst today. He also stated he is not bale to keep food down today.     Triage Assessment (Adult)       Row Name 02/11/24 3347          Triage Assessment    Airway WDL WDL        Respiratory WDL    Respiratory WDL WDL        Skin Circulation/Temperature WDL    Skin Circulation/Temperature WDL WDL        Cardiac WDL    Cardiac WDL WDL        Peripheral/Neurovascular WDL    Peripheral Neurovascular WDL WDL        Cognitive/Neuro/Behavioral WDL    Cognitive/Neuro/Behavioral WDL WDL

## 2024-02-12 LAB
C TRACH DNA SPEC QL PROBE+SIG AMP: NEGATIVE
N GONORRHOEA DNA SPEC QL NAA+PROBE: NEGATIVE

## 2024-02-18 ENCOUNTER — OFFICE VISIT (OUTPATIENT)
Dept: URGENT CARE | Facility: URGENT CARE | Age: 24
End: 2024-02-18
Payer: COMMERCIAL

## 2024-02-18 VITALS
SYSTOLIC BLOOD PRESSURE: 120 MMHG | WEIGHT: 146 LBS | BODY MASS INDEX: 19.26 KG/M2 | OXYGEN SATURATION: 100 % | DIASTOLIC BLOOD PRESSURE: 80 MMHG | TEMPERATURE: 97 F | HEART RATE: 69 BPM | RESPIRATION RATE: 16 BRPM

## 2024-02-18 DIAGNOSIS — K29.50 CHRONIC GASTRITIS WITHOUT BLEEDING, UNSPECIFIED GASTRITIS TYPE: Primary | ICD-10-CM

## 2024-02-18 DIAGNOSIS — N45.1 EPIDIDYMITIS: ICD-10-CM

## 2024-02-18 PROCEDURE — 87338 HPYLORI STOOL AG IA: CPT | Performed by: INTERNAL MEDICINE

## 2024-02-18 PROCEDURE — 99214 OFFICE O/P EST MOD 30 MIN: CPT | Performed by: INTERNAL MEDICINE

## 2024-02-18 RX ORDER — CIPROFLOXACIN 500 MG/1
500 TABLET, FILM COATED ORAL 2 TIMES DAILY
Qty: 10 TABLET | Refills: 0 | Status: SHIPPED | OUTPATIENT
Start: 2024-02-18 | End: 2024-02-23

## 2024-02-18 RX ORDER — OMEPRAZOLE 40 MG/1
40 CAPSULE, DELAYED RELEASE ORAL DAILY
Qty: 28 CAPSULE | Refills: 0 | Status: SHIPPED | OUTPATIENT
Start: 2024-02-18

## 2024-02-18 NOTE — PATIENT INSTRUCTIONS
We will check you for Helicobacter pylori, the bacterial infection that is connected to stomach ulcers.  This will be with the stool test.  After you have collected the stool test, start taking omeprazole (stronger medicine for your stomach).  Stop the doxycycline and replace this with ciprofloxacin.

## 2024-02-18 NOTE — PROGRESS NOTES
SUBJECTIVE:  Chief complaint of nausea and abdominal pain.  He was recently prescribed doxycycline for epididymitis.  Now noting more upper epigastric pain, nausea and vomiting.  Of note, he describes at least four months of problems with dyspepsia.  He has several ER visits going back to mid-October for chest pain, gastritis and GERD.  Has been using OTC famotidine which helps transiently.  He does have a family history of ulcer in his mother.  Not certain on H pylori.     PMH: no chronic medical conditions or hospitalizations    SOCH: denies EtOH; formerly he vaped nicotine and was smoking marijuana however he has stopped using both of these substances over the past month    ROS:  The following systems have been completely reviewed and are negative except as noted in the HPI: CONSTITUTIONAL, HEAD AND NECK, CARDIOVASCULAR, PULMONARY, GASTROINTESTINAL, and GENITOURINARY     OBJECTIVE:  /80   Pulse 69   Temp 97  F (36.1  C) (Tympanic)   Resp 16   Wt 66.2 kg (146 lb)   SpO2 100%   BMI 19.26 kg/m     GENERAL: healthy, alert and no distress  ABDOMEN: soft, non-distended, and with moderate epigastric tenderness without rebound/guarding.  There is no hepatosplenomegaly or masses and normal bowel sounds    ASSESSMENT/PLAN:    ICD-10-CM    1. Chronic gastritis without bleeding, unspecified gastritis type  K29.50 Helicobacter pylori Antigen Stool     omeprazole (PRILOSEC) 40 MG DR capsule     Adult GI  Referral - Consult Only     Helicobacter pylori Antigen Stool      2. Epididymitis  N45.1 ciprofloxacin (CIPRO) 500 MG tablet        Patient Instructions   We will check you for Helicobacter pylori, the bacterial infection that is connected to stomach ulcers.  This will be with the stool test.  After you have collected the stool test, start taking omeprazole (stronger medicine for your stomach).  Stop the doxycycline and replace this with ciprofloxacin.     Donato Christensen MD .

## 2024-02-20 LAB — H PYLORI AG STL QL IA: NEGATIVE

## 2024-04-02 ENCOUNTER — OFFICE VISIT (OUTPATIENT)
Dept: INTERNAL MEDICINE | Facility: CLINIC | Age: 24
End: 2024-04-02
Payer: COMMERCIAL

## 2024-04-02 VITALS
SYSTOLIC BLOOD PRESSURE: 117 MMHG | WEIGHT: 154.4 LBS | RESPIRATION RATE: 16 BRPM | HEART RATE: 60 BPM | OXYGEN SATURATION: 99 % | HEIGHT: 73 IN | BODY MASS INDEX: 20.46 KG/M2 | DIASTOLIC BLOOD PRESSURE: 77 MMHG | TEMPERATURE: 97.7 F

## 2024-04-02 DIAGNOSIS — Z11.59 NEED FOR HEPATITIS C SCREENING TEST: ICD-10-CM

## 2024-04-02 DIAGNOSIS — Z11.4 SCREENING FOR HIV (HUMAN IMMUNODEFICIENCY VIRUS): ICD-10-CM

## 2024-04-02 DIAGNOSIS — Z00.00 ROUTINE GENERAL MEDICAL EXAMINATION AT A HEALTH CARE FACILITY: Primary | ICD-10-CM

## 2024-04-02 DIAGNOSIS — Z13.21 ENCOUNTER FOR VITAMIN DEFICIENCY SCREENING: ICD-10-CM

## 2024-04-02 LAB
HCV AB SERPL QL IA: NONREACTIVE
HIV 1+2 AB+HIV1 P24 AG SERPL QL IA: NONREACTIVE
VIT D+METAB SERPL-MCNC: 16 NG/ML (ref 20–50)

## 2024-04-02 PROCEDURE — 90472 IMMUNIZATION ADMIN EACH ADD: CPT | Performed by: INTERNAL MEDICINE

## 2024-04-02 PROCEDURE — 36415 COLL VENOUS BLD VENIPUNCTURE: CPT | Performed by: INTERNAL MEDICINE

## 2024-04-02 PROCEDURE — 90471 IMMUNIZATION ADMIN: CPT | Performed by: INTERNAL MEDICINE

## 2024-04-02 PROCEDURE — 99395 PREV VISIT EST AGE 18-39: CPT | Mod: 25 | Performed by: INTERNAL MEDICINE

## 2024-04-02 PROCEDURE — 82306 VITAMIN D 25 HYDROXY: CPT | Performed by: INTERNAL MEDICINE

## 2024-04-02 PROCEDURE — 87389 HIV-1 AG W/HIV-1&-2 AB AG IA: CPT | Performed by: INTERNAL MEDICINE

## 2024-04-02 PROCEDURE — 90651 9VHPV VACCINE 2/3 DOSE IM: CPT | Performed by: INTERNAL MEDICINE

## 2024-04-02 PROCEDURE — 86803 HEPATITIS C AB TEST: CPT | Performed by: INTERNAL MEDICINE

## 2024-04-02 PROCEDURE — 90715 TDAP VACCINE 7 YRS/> IM: CPT | Performed by: INTERNAL MEDICINE

## 2024-04-02 SDOH — HEALTH STABILITY: PHYSICAL HEALTH: ON AVERAGE, HOW MANY DAYS PER WEEK DO YOU ENGAGE IN MODERATE TO STRENUOUS EXERCISE (LIKE A BRISK WALK)?: 3 DAYS

## 2024-04-02 ASSESSMENT — SOCIAL DETERMINANTS OF HEALTH (SDOH): HOW OFTEN DO YOU GET TOGETHER WITH FRIENDS OR RELATIVES?: NEVER

## 2024-04-02 NOTE — PROGRESS NOTES
Preventive Care Visit  [facility]  Dio Zapata MD, Internal Medicine  Apr 2, 2024      Assessment & Plan     Routine general medical examination at a health care facility  Updated screening, immunizations, prevention.  Please see health maintenance list, care gaps   Reports only occasional heartburn- using OTC H2B  or PPI prn. Would recommend cont prn OTC use only     Screening for HIV (human immunodeficiency virus)  - HIV Antigen Antibody Combo; Future  - HIV Antigen Antibody Combo    Need for hepatitis C screening test  - Hepatitis C Screen Reflex to HCV RNA Quant and Genotype; Future  - Hepatitis C Screen Reflex to HCV RNA Quant and Genotype    Encounter for vitamin deficiency screening  - Vitamin D Deficiency; Future  - Vitamin D Deficiency    Patient has been advised of split billing requirements and indicates understanding: Yes          Counseling  Appropriate preventive services were discussed with this patient, including applicable screening as appropriate for fall prevention, nutrition, physical activity, Tobacco-use cessation, weight loss and cognition.  Checklist reviewing preventive services available has been given to the patient.  Reviewed patient's diet, addressing concerns and/or questions.   He is at risk for lack of exercise and has been provided with information to increase physical activity for the benefit of his well-being.   Patient is at risk for social isolation and has been provided with information about the benefit of social connection.   The patient was instructed to see the dentist every 6 months.       Regular exercise  See Patient Instructions    Ruma Bello is a 23 year old, presenting for the following:  Physical         Health Care Directive  Patient does not have a Health Care Directive or Living Will  HPI  No complaints             4/2/2024   General Health   How would you rate your overall physical health? Good   Feel stress (tense, anxious, or  unable to sleep) To some extent   (!) STRESS CONCERN      4/2/2024   Nutrition   Three or more servings of calcium each day? (!) NO   Diet: Regular (no restrictions)   How many servings of fruit and vegetables per day? (!) 0-1   How many sweetened beverages each day? (!) 3         4/2/2024   Exercise   Days per week of moderate/strenous exercise 3 days         4/2/2024   Social Factors   Frequency of gathering with friends or relatives Never   Worry food won't last until get money to buy more No   Food not last or not have enough money for food? No   Do you have housing?  Yes   Are you worried about losing your housing? No   Lack of transportation? No   Unable to get utilities (heat,electricity)? No   (!) SOCIAL CONNECTIONS CONCERN      4/2/2024   Dental   Dentist two times every year? (!) NO         4/2/2024   TB Screening   Were you born outside of the [place]? No         Today's PHQ-2 Score:       4/2/2024     7:56 AM   PHQ-2 ( 1999 [employer])   Q1: Little interest or pleasure in doing things 0   Q2: Feeling down, depressed or hopeless 1   PHQ-2 Score 1   Q1: Little interest or pleasure in doing things Not at all   Q2: Feeling down, depressed or hopeless Several days   PHQ-2 Score 1           4/2/2024   Substance Use   Alcohol more than 3/day or more than 7/wk No   Do you use any other substances recreationally? No     Social History     Tobacco Use    Smoking status: Never     Passive exposure: Never    Smokeless tobacco: Never   Vaping Use    Vaping Use: Never used   Substance Use Topics    Alcohol use: Never    Drug use: Never             4/2/2024   One time HIV Screening   Previous HIV test? I don't know         4/2/2024   STI Screening   New sexual partner(s) since last STI/HIV test? No         4/2/2024   Contraception/Family Planning   Questions about contraception or family planning No        Reviewed and updated as needed this visit by Provider                    Lab work is in process  Labs reviewed in  "EPIC         Objective    Exam  /77   Pulse 60   Temp 97.7  F (36.5  C) (Temporal)   Resp 16   Ht 1.854 m (6' 1\")   Wt 70 kg (154 lb 6.4 oz)   SpO2 99%   BMI 20.37 kg/m     Estimated body mass index is 20.37 kg/m  as calculated from the following:    Height as of this encounter: 1.854 m (6' 1\").    Weight as of this encounter: 70 kg (154 lb 6.4 oz).    Physical Exam  GENERAL: alert and no distress  EYES: Eyes grossly normal to inspection, PERRL and conjunctivae and sclerae normal  HENT: ear canals and TM's normal, nose and mouth without ulcers or lesions  NECK: no adenopathy, no asymmetry, masses, or scars  RESP: lungs clear to auscultation - no rales, rhonchi or wheezes  CV: regular rate and rhythm, normal S1 S2, no S3 or S4, no murmur, click or rub, no peripheral edema  ABDOMEN: soft, nontender, no hepatosplenomegaly, no masses and bowel sounds normal  MS: no gross musculoskeletal defects noted, no edema  SKIN: no suspicious lesions or rashes  NEURO: Normal strength and tone, mentation intact and speech normal  PSYCH: mentation appears normal, affect normal/bright  LYMPH: no cervical adenopathy        Signed Electronically by: Dio Zapata MD    "

## 2024-04-02 NOTE — PATIENT INSTRUCTIONS
Today April 2, 2024 you received the      HPV Vaccine - Please return on 5/2/2024 for your 2nd dose and 9/29/2024 for your 3rd and final dose.    These appointments can be made as a NURSE ONLY visit.    **It is very important for the vaccinations to be given on the scheduled day(s), this helps ensure you receive the full effectiveness of the vaccine.**        Preventive Care Advice   This is general advice given by our system to help you stay healthy. However, your care team may have specific advice just for you. Please talk to your care team about your preventive care needs.  Nutrition  Eat 5 or more servings of fruits and vegetables each day.  Try wheat bread, brown rice and whole grain pasta (instead of white bread, rice, and pasta).  Get enough calcium and vitamin D. Check the label on foods and aim for 100% of the RDA (recommended daily allowance).  Lifestyle  Exercise at least 150 minutes each week   (30 minutes a day, 5 days a week).  Do muscle strengthening activities 2 days a week. These help control your weight and prevent disease.  No smoking.  Wear sunscreen to prevent skin cancer.  Have a dental exam and cleaning every 6 months.  Yearly exams  See your health care team every year to talk about:  Any changes in your health.  Any medicines your care team has prescribed.  Preventive care, family planning, and ways to prevent chronic diseases.  Shots (vaccines)   HPV shots (up to age 26), if you've never had them before.  Hepatitis B shots (up to age 59), if you've never had them before.  COVID-19 shot: Get this shot when it's due.  Flu shot: Get a flu shot every year.  Tetanus shot: Get a tetanus shot every 10 years.  Pneumococcal, hepatitis A, and RSV shots: Ask your care team if you need these based on your risk.  Shingles shot (for age 50 and up).  General health tests  Diabetes screening:  Starting at age 35, Get screened for diabetes at least every 3 years.  If you are younger than age 35, ask your  care team if you should be screened for diabetes.  Cholesterol test: At age 39, start having a cholesterol test every 5 years, or more often if advised.  Bone density scan (DEXA): At age 50, ask your care team if you should have this scan for osteoporosis (brittle bones).  Hepatitis C: Get tested at least once in your life.  STIs (sexually transmitted infections)  Before age 24: Ask your care team if you should be screened for STIs.  After age 24: Get screened for STIs if you're at risk. You are at risk for STIs (including HIV) if:  You are sexually active with more than one person.  You don't use condoms every time.  You or a partner was diagnosed with a sexually transmitted infection.  If you are at risk for HIV, ask about PrEP medicine to prevent HIV.  Get tested for HIV at least once in your life, whether you are at risk for HIV or not.  Cancer screening tests  Cervical cancer screening: If you have a cervix, begin getting regular cervical cancer screening tests at age 21. Most people who have regular screenings with normal results can stop after age 65. Talk about this with your provider.  Breast cancer scan (mammogram): If you've ever had breasts, begin having regular mammograms starting at age 40. This is a scan to check for breast cancer.  Colon cancer screening: It is important to start screening for colon cancer at age 45.  Have a colonoscopy test every 10 years (or more often if you're at risk) Or, ask your provider about stool tests like a FIT test every year or Cologuard test every 3 years.  To learn more about your testing options, visit: https://www.MobileOCT/892167.pdf.  For help making a decision, visit: https://bit.ly/uz05200.  Prostate cancer screening test: If you have a prostate and are age 55 to 69, ask your provider if you would benefit from a yearly prostate cancer screening test.  Lung cancer screening: If you are a current or former smoker age 50 to 80, ask your care team if ongoing lung  cancer screenings are right for you.  For informational purposes only. Not to replace the advice of your health care provider. Copyright   2023 [facility]. All rights reserved. Clinically reviewed by the [facility]. SMARTworks 594322 - REV 01/24.    Relationships for Good Health  Relationships are important for our health and happiness. Social isolation, loneliness and lack of support are bad for your health. Studies show that loneliness can harm health and limit your life span as much as high blood pressure and smoking.   Take some time to reflect on your relationships. Then answer these questions:  Are there people in your life that cause you stress or drain your energy? What can you do to set limits?  ________________________________________________________________________________________________________________________________________________________________________________________________________________________________________________________________________________________________________________________________________________  Who do you enjoy spending time with? Who can you go to for support?  ________________________________________________________________________________________________________________________________________________________________________________________________________________________________________________________________________________________________________________________________________________  What can you do to improve your relationships with others?  __________________________________________________________________________________________________________________________________________________________________________________________________________________  ______________________________________________________________________________________________________________________________  What do you like most about your relationships  with others?  ________________________________________________________________________________________________________________________________________________________________________________________________________________________________________________________________________________________________________________________________________________  My goal: ______________________________________________________________________  I will ______________________________________________________________________________________________________________________________________________________________________________________________    For informational purposes only. Not to replace the advice of your health care provider. Copyright   2018 [facility]. All rights reserved. Clinically reviewed by [facility]  [facility]. Zerve 104946 - Rev 04/21.    Learning About Stress  What is stress?     Stress is your body's response to a hard situation. Your body can have a physical, emotional, or mental response. Stress is a fact of life for most people, and it affects everyone differently. What causes stress for you may not be stressful for someone else.  A lot of things can cause stress. You may feel stress when you go on a job interview, take a test, or run a race. This kind of short-term stress is normal and even useful. It can help you if you need to work hard or react quickly. For example, stress can help you finish an important job on time.  Long-term stress is caused by ongoing stressful situations or events. Examples of long-term stress include long-term health problems, ongoing problems at work, or conflicts in your family. Long-term stress can harm your health.  How does stress affect your health?  When you are stressed, your body responds as though you are in danger. It makes hormones that speed up your heart, make you breathe faster, and give you a burst of energy. This is called the fight-or-flight  stress response. If the stress is over quickly, your body goes back to normal and no harm is done.  But if stress happens too often or lasts too long, it can have bad effects. Long-term stress can make you more likely to get sick, and it can make symptoms of some diseases worse. If you tense up when you are stressed, you may develop neck, shoulder, or low back pain. Stress is linked to high blood pressure and heart disease.  Stress also harms your emotional health. It can make you murdock, tense, or depressed. Your relationships may suffer, and you may not do well at work or school.  What can you do to manage stress?  You can try these things to help manage stress:   Do something active. Exercise or activity can help reduce stress. Walking is a great way to get started. Even everyday activities such as housecleaning or yard work can help.  Try yoga or kassidy chi. These techniques combine exercise and meditation. You may need some training at first to learn them.  Do something you enjoy. For example, listen to music or go to a movie. Practice your hobby or do volunteer work.  Meditate. This can help you relax, because you are not worrying about what happened before or what may happen in the future.  Do guided imagery. Imagine yourself in any setting that helps you feel calm. You can use online videos, books, or a teacher to guide you.  Do breathing exercises. For example:  From a standing position, bend forward from the waist with your knees slightly bent. Let your arms dangle close to the floor.  Breathe in slowly and deeply as you return to a standing position. Roll up slowly and lift your head last.  Hold your breath for just a few seconds in the standing position.  Breathe out slowly and bend forward from the waist.  Let your feelings out. Talk, laugh, cry, and express anger when you need to. Talking with supportive friends or family, a counselor, or a minor leader about your feelings is a healthy way to relieve  "stress. Avoid discussing your feelings with people who make you feel worse.  Write. It may help to write about things that are bothering you. This helps you find out how much stress you feel and what is causing it. When you know this, you can find better ways to cope.  What can you do to prevent stress?  You might try some of these things to help prevent stress:  Manage your time. This helps you find time to do the things you want and need to do.  Get enough sleep. Your body recovers from the stresses of the day while you are sleeping.  Get support. Your family, friends, and community can make a difference in how you experience stress.  Limit your news feed. Avoid or limit time on social media or news that may make you feel stressed.  Do something active. Exercise or activity can help reduce stress. Walking is a great way to get started.  Where can you learn more?  Go to https://www.Firethorn.net/patiented  Enter N032 in the search box to learn more about \"Learning About Stress.\"  Current as of: October 24, 2023               Content Version: 14.0    5606-2879 [email or web address].   Care instructions adapted under license by your healthcare professional. If you have questions about a medical condition or this instruction, always ask your healthcare professional. [email or web address] disclaims any warranty or liability for your use of this information.      "

## 2024-05-08 ENCOUNTER — HOSPITAL ENCOUNTER (EMERGENCY)
Facility: CLINIC | Age: 24
Discharge: HOME | End: 2024-05-08
Attending: EMERGENCY MEDICINE | Admitting: EMERGENCY MEDICINE
Payer: COMMERCIAL

## 2024-05-08 ENCOUNTER — APPOINTMENT (OUTPATIENT)
Dept: GENERAL RADIOLOGY | Facility: CLINIC | Age: 24
End: 2024-05-08
Attending: EMERGENCY MEDICINE
Payer: COMMERCIAL

## 2024-05-08 VITALS
HEART RATE: 64 BPM | OXYGEN SATURATION: 100 % | SYSTOLIC BLOOD PRESSURE: 107 MMHG | RESPIRATION RATE: 20 BRPM | TEMPERATURE: 97.7 F | DIASTOLIC BLOOD PRESSURE: 60 MMHG

## 2024-05-08 DIAGNOSIS — R07.9 CHEST PAIN, UNSPECIFIED TYPE: ICD-10-CM

## 2024-05-08 LAB
ALBUMIN SERPL BCG-MCNC: 4.7 G/DL (ref 3.5–5.2)
ALP SERPL-CCNC: 111 U/L (ref 40–150)
ALT SERPL W P-5'-P-CCNC: 17 U/L (ref 0–70)
ANION GAP SERPL CALCULATED.3IONS-SCNC: 12 MMOL/L (ref 7–15)
AST SERPL W P-5'-P-CCNC: 23 U/L (ref 0–45)
BASOPHILS # BLD AUTO: 0.1 10E3/UL (ref 0–0.2)
BASOPHILS NFR BLD AUTO: 1 %
BILIRUB SERPL-MCNC: 0.4 MG/DL
BUN SERPL-MCNC: 8.6 MG/DL (ref 6–20)
CALCIUM SERPL-MCNC: 9.5 MG/DL (ref 8.6–10)
CHLORIDE SERPL-SCNC: 104 MMOL/L (ref 98–107)
CREAT SERPL-MCNC: 0.93 MG/DL (ref 0.67–1.17)
DEPRECATED HCO3 PLAS-SCNC: 25 MMOL/L (ref 22–29)
EGFRCR SERPLBLD CKD-EPI 2021: >90 ML/MIN/1.73M2
EOSINOPHIL # BLD AUTO: 0.3 10E3/UL (ref 0–0.7)
EOSINOPHIL NFR BLD AUTO: 3 %
ERYTHROCYTE [DISTWIDTH] IN BLOOD BY AUTOMATED COUNT: 11.9 % (ref 10–15)
GLUCOSE SERPL-MCNC: 93 MG/DL (ref 70–99)
HCT VFR BLD AUTO: 45.2 % (ref 40–53)
HGB BLD-MCNC: 15 G/DL (ref 13.3–17.7)
HOLD SPECIMEN: NORMAL
IMM GRANULOCYTES # BLD: 0 10E3/UL
IMM GRANULOCYTES NFR BLD: 0 %
LYMPHOCYTES # BLD AUTO: 2.4 10E3/UL (ref 0.8–5.3)
LYMPHOCYTES NFR BLD AUTO: 27 %
MCH RBC QN AUTO: 29.9 PG (ref 26.5–33)
MCHC RBC AUTO-ENTMCNC: 33.2 G/DL (ref 31.5–36.5)
MCV RBC AUTO: 90 FL (ref 78–100)
MONOCYTES # BLD AUTO: 0.4 10E3/UL (ref 0–1.3)
MONOCYTES NFR BLD AUTO: 5 %
NEUTROPHILS # BLD AUTO: 5.6 10E3/UL (ref 1.6–8.3)
NEUTROPHILS NFR BLD AUTO: 64 %
NRBC # BLD AUTO: 0 10E3/UL
NRBC BLD AUTO-RTO: 0 /100
PLATELET # BLD AUTO: 260 10E3/UL (ref 150–450)
POTASSIUM SERPL-SCNC: 3.9 MMOL/L (ref 3.4–5.3)
PROT SERPL-MCNC: 7.3 G/DL (ref 6.4–8.3)
RBC # BLD AUTO: 5.02 10E6/UL (ref 4.4–5.9)
SODIUM SERPL-SCNC: 141 MMOL/L (ref 135–145)
TROPONIN T SERPL HS-MCNC: <6 NG/L
WBC # BLD AUTO: 8.7 10E3/UL (ref 4–11)

## 2024-05-08 PROCEDURE — 36415 COLL VENOUS BLD VENIPUNCTURE: CPT | Performed by: INTERNAL MEDICINE

## 2024-05-08 PROCEDURE — 85025 COMPLETE CBC W/AUTO DIFF WBC: CPT | Performed by: EMERGENCY MEDICINE

## 2024-05-08 PROCEDURE — 71046 X-RAY EXAM CHEST 2 VIEWS: CPT

## 2024-05-08 PROCEDURE — 80053 COMPREHEN METABOLIC PANEL: CPT | Performed by: EMERGENCY MEDICINE

## 2024-05-08 PROCEDURE — 84484 ASSAY OF TROPONIN QUANT: CPT | Performed by: EMERGENCY MEDICINE

## 2024-05-08 PROCEDURE — 84484 ASSAY OF TROPONIN QUANT: CPT | Performed by: INTERNAL MEDICINE

## 2024-05-08 PROCEDURE — 80053 COMPREHEN METABOLIC PANEL: CPT | Performed by: INTERNAL MEDICINE

## 2024-05-08 PROCEDURE — 99285 EMERGENCY DEPT VISIT HI MDM: CPT | Mod: 25

## 2024-05-08 PROCEDURE — 85025 COMPLETE CBC W/AUTO DIFF WBC: CPT | Performed by: INTERNAL MEDICINE

## 2024-05-08 PROCEDURE — 93005 ELECTROCARDIOGRAM TRACING: CPT

## 2024-05-08 ASSESSMENT — COLUMBIA-SUICIDE SEVERITY RATING SCALE - C-SSRS
6. HAVE YOU EVER DONE ANYTHING, STARTED TO DO ANYTHING, OR PREPARED TO DO ANYTHING TO END YOUR LIFE?: NO
1. IN THE PAST MONTH, HAVE YOU WISHED YOU WERE DEAD OR WISHED YOU COULD GO TO SLEEP AND NOT WAKE UP?: NO
2. HAVE YOU ACTUALLY HAD ANY THOUGHTS OF KILLING YOURSELF IN THE PAST MONTH?: NO

## 2024-05-08 ASSESSMENT — ACTIVITIES OF DAILY LIVING (ADL)
ADLS_ACUITY_SCORE: 35
ADLS_ACUITY_SCORE: 35

## 2024-05-08 NOTE — ED PROVIDER NOTES
Emergency Department Note      History of Present Illness     Chief Complaint  Chest Pain    HPI  Ace Chavira is a 24 year old male who presents to the ED for evaluation of chest pain. He reports having mid sternal chest pain at 2200 when he was  laying down at home along with throat tightness, chest tightness, and shortness of breath. Patient has issues with anxiety and doesn't take medications for anxiety. He states feeling better now. No medical problems. No recent travel.     Independent Historian  None    Review of External Notes  None  Past Medical History   Medical History and Problem List  Gastritis     Medications  Prilosec   Compazine  Pepcid  Zofran   Physical Exam   Patient Vitals for the past 24 hrs:   BP Temp Temp src Pulse Resp SpO2   05/08/24 0026 107/60 97.7  F (36.5  C) Oral 64 20 100 %     Physical Exam  Constitutional: Alert, attentive, GCS 15  HENT:    Nose: Nose normal.    Mouth/Throat: Oropharynx is clear, mucous membranes are moist  Eyes: EOM are normal, anicteric, conjugate gaze  CV: regular rate and rhythm   Chest: Effort normal and breath sounds clear without wheezing or rales, symmetric bilaterally   GI:  non tender. No distension. No guarding or rebound.    MSK: No LE edema, no tenderness to palpation of BLE.  Neurological: Alert, attentive, moving all extremities equally.   Skin: Skin is warm and dry.    Diagnostics   Lab Results   Labs Ordered and Resulted from Time of ED Arrival to Time of ED Departure   COMPREHENSIVE METABOLIC PANEL - Normal       Result Value    Sodium 141      Potassium 3.9      Carbon Dioxide (CO2) 25      Anion Gap 12      Urea Nitrogen 8.6      Creatinine 0.93      GFR Estimate >90      Calcium 9.5      Chloride 104      Glucose 93      Alkaline Phosphatase 111      AST 23      ALT 17      Protein Total 7.3      Albumin 4.7      Bilirubin Total 0.4     TROPONIN T, HIGH SENSITIVITY - Normal    Troponin T, High Sensitivity <6     CBC WITH PLATELETS AND  DIFFERENTIAL    WBC Count 8.7      RBC Count 5.02      Hemoglobin 15.0      Hematocrit 45.2      MCV 90      MCH 29.9      MCHC 33.2      RDW 11.9      Platelet Count 260      % Neutrophils 64      % Lymphocytes 27      % Monocytes 5      % Eosinophils 3      % Basophils 1      % Immature Granulocytes 0      NRBCs per 100 WBC 0      Absolute Neutrophils 5.6      Absolute Lymphocytes 2.4      Absolute Monocytes 0.4      Absolute Eosinophils 0.3      Absolute Basophils 0.1      Absolute Immature Granulocytes 0.0      Absolute NRBCs 0.0       Imaging  Chest XR,  PA & LAT   Final Result   IMPRESSION: No evidence of active cardiopulmonary disease.         Report per radiology    EKG   ECG taken at 0029, ECG read at 0141  Sinus rhythm with marked  sinus arrhythmia   Nonspecific ST and T wave abnormality   Rate 72 bpm. LA interval 152 ms. QRS duration 82 ms. QT/QTc 362/396 ms. P-R-T axes 65 43 76.     Independent Interpretation  None  ED Course    Medications Administered  Medications - No data to display    Procedures  Procedures     Discussion of Management  None    Social Determinants of Health adding to complexity of care  None    ED Course  ED Course as of 05/08/24 0302   Wed May 08, 2024   0142 I obtained history and examined the patient as noted above.    0219 I prepared the patient to be discharged home.      Medical Decision Making / Diagnosis   MDM  24 without significant past medical history beyond anxiety presenting for now resolved substernal chest pain with abnormal taste in his mouth that began  several hours prior to arrival.  Screen EKG on arrival shows no ischemic changes, high-sensitivity troponin is negative.  He is PERC negative.  Chest x-ray is clear.  I did review with him that certainly this could be stomach acid related though he does take Pepcid.  I have low suspicion for cardiac etiology or dysrhythmia.  I recommended continued conservative management Tylenol, Pepcid and PCP follow-up.  Return  precautions reviewed he was discharged    Disposition  The patient was discharged.     ICD-10 Codes:    ICD-10-CM    1. Chest pain, unspecified type  R07.9          Magan Crespo MD  [employer]  3:03 AM 05/08/24       Scribe Disclosure:  I, Gloria Fam, am serving as a scribe at 1:47 AM on 5/8/2024 to document services personally performed by Magan Crespo MD based on my observations and the provider's statements to me.   [employer]      Magan Crespo MD  05/08/24 0346

## 2024-05-08 NOTE — ED TRIAGE NOTES
Midsternal chest pain since 1400 and feeling anxious all day. Pain 8/10 at this time.      Triage Assessment (Adult)       Row Name 05/08/24 0026          Triage Assessment    Airway WDL WDL        Respiratory WDL    Respiratory WDL WDL        Skin Circulation/Temperature WDL    Skin Circulation/Temperature WDL WDL        Cardiac WDL    Cardiac WDL X;chest pain        Chest Pain Assessment    Chest Pain Location midsternal;epigastric      Associated Signs/Symptoms anxiety      Chest Pain Intervention cardiac biomarkers drawn;12-lead ECG obtained        Peripheral/Neurovascular WDL    Peripheral Neurovascular WDL WDL        Cognitive/Neuro/Behavioral WDL    Cognitive/Neuro/Behavioral WDL WDL

## 2024-05-09 LAB
ATRIAL RATE - MUSE: 72 BPM
DIASTOLIC BLOOD PRESSURE - MUSE: NORMAL MMHG
INTERPRETATION ECG - MUSE: NORMAL
P AXIS - MUSE: 65 DEGREES
PR INTERVAL - MUSE: 152 MS
QRS DURATION - MUSE: 82 MS
QT - MUSE: 362 MS
QTC - MUSE: 396 MS
R AXIS - MUSE: 43 DEGREES
SYSTOLIC BLOOD PRESSURE - MUSE: NORMAL MMHG
T AXIS - MUSE: 76 DEGREES
VENTRICULAR RATE- MUSE: 72 BPM

## 2024-08-18 ENCOUNTER — HOSPITAL ENCOUNTER (EMERGENCY)
Facility: CLINIC | Age: 24
Discharge: HOME | End: 2024-08-18
Attending: EMERGENCY MEDICINE | Admitting: EMERGENCY MEDICINE

## 2024-08-18 VITALS
HEART RATE: 59 BPM | TEMPERATURE: 99.1 F | WEIGHT: 153 LBS | OXYGEN SATURATION: 100 % | BODY MASS INDEX: 20.28 KG/M2 | RESPIRATION RATE: 18 BRPM | SYSTOLIC BLOOD PRESSURE: 106 MMHG | HEIGHT: 73 IN | DIASTOLIC BLOOD PRESSURE: 55 MMHG

## 2024-08-18 DIAGNOSIS — R11.2 NAUSEA AND VOMITING: ICD-10-CM

## 2024-08-18 LAB
ALBUMIN SERPL BCG-MCNC: 4.7 G/DL (ref 3.5–5.2)
ALP SERPL-CCNC: 85 U/L (ref 40–150)
ALT SERPL W P-5'-P-CCNC: 11 U/L (ref 0–70)
ANION GAP SERPL CALCULATED.3IONS-SCNC: 12 MMOL/L (ref 7–15)
AST SERPL W P-5'-P-CCNC: 23 U/L (ref 0–45)
BILIRUB SERPL-MCNC: 0.7 MG/DL
BUN SERPL-MCNC: 16.4 MG/DL (ref 6–20)
CALCIUM SERPL-MCNC: 8.9 MG/DL (ref 8.8–10.4)
CHLORIDE SERPL-SCNC: 105 MMOL/L (ref 98–107)
CREAT SERPL-MCNC: 0.97 MG/DL (ref 0.67–1.17)
EGFRCR SERPLBLD CKD-EPI 2021: >90 ML/MIN/1.73M2
ERYTHROCYTE [DISTWIDTH] IN BLOOD BY AUTOMATED COUNT: 12.1 % (ref 10–15)
FLUAV RNA SPEC QL NAA+PROBE: NEGATIVE
FLUBV RNA RESP QL NAA+PROBE: NEGATIVE
GLUCOSE SERPL-MCNC: 97 MG/DL (ref 70–99)
HCO3 SERPL-SCNC: 27 MMOL/L (ref 22–29)
HCT VFR BLD AUTO: 41.5 % (ref 40–53)
HGB BLD-MCNC: 14.3 G/DL (ref 13.3–17.7)
LIPASE SERPL-CCNC: 20 U/L (ref 13–60)
MCH RBC QN AUTO: 30.2 PG (ref 26.5–33)
MCHC RBC AUTO-ENTMCNC: 34.5 G/DL (ref 31.5–36.5)
MCV RBC AUTO: 88 FL (ref 78–100)
PLATELET # BLD AUTO: 236 10E3/UL (ref 150–450)
POTASSIUM SERPL-SCNC: 3.7 MMOL/L (ref 3.4–5.3)
PROT SERPL-MCNC: 7 G/DL (ref 6.4–8.3)
RBC # BLD AUTO: 4.74 10E6/UL (ref 4.4–5.9)
RSV RNA SPEC NAA+PROBE: NEGATIVE
SARS-COV-2 RNA RESP QL NAA+PROBE: NEGATIVE
SODIUM SERPL-SCNC: 144 MMOL/L (ref 135–145)
WBC # BLD AUTO: 8.1 10E3/UL (ref 4–11)

## 2024-08-18 PROCEDURE — 250N000013 HC RX MED GY IP 250 OP 250 PS 637

## 2024-08-18 PROCEDURE — 250N000011 HC RX IP 250 OP 636

## 2024-08-18 PROCEDURE — 87637 SARSCOV2&INF A&B&RSV AMP PRB: CPT

## 2024-08-18 PROCEDURE — 96361 HYDRATE IV INFUSION ADD-ON: CPT | Performed by: EMERGENCY MEDICINE

## 2024-08-18 PROCEDURE — 258N000003 HC RX IP 258 OP 636

## 2024-08-18 PROCEDURE — 83690 ASSAY OF LIPASE: CPT

## 2024-08-18 PROCEDURE — 96374 THER/PROPH/DIAG INJ IV PUSH: CPT | Performed by: EMERGENCY MEDICINE

## 2024-08-18 PROCEDURE — 85027 COMPLETE CBC AUTOMATED: CPT

## 2024-08-18 PROCEDURE — 80053 COMPREHEN METABOLIC PANEL: CPT

## 2024-08-18 PROCEDURE — 99284 EMERGENCY DEPT VISIT MOD MDM: CPT | Mod: 25 | Performed by: EMERGENCY MEDICINE

## 2024-08-18 PROCEDURE — 36415 COLL VENOUS BLD VENIPUNCTURE: CPT

## 2024-08-18 RX ORDER — ONDANSETRON 2 MG/ML
4 INJECTION INTRAMUSCULAR; INTRAVENOUS ONCE
Status: COMPLETED | OUTPATIENT
Start: 2024-08-18 | End: 2024-08-18

## 2024-08-18 RX ORDER — ONDANSETRON 4 MG/1
4 TABLET, ORALLY DISINTEGRATING ORAL EVERY 6 HOURS PRN
Qty: 12 TABLET | Refills: 0 | Status: SHIPPED | OUTPATIENT
Start: 2024-08-18

## 2024-08-18 RX ORDER — SUCRALFATE ORAL 1 G/10ML
1 SUSPENSION ORAL ONCE
Status: COMPLETED | OUTPATIENT
Start: 2024-08-18 | End: 2024-08-18

## 2024-08-18 RX ADMIN — ONDANSETRON 4 MG: 2 INJECTION INTRAMUSCULAR; INTRAVENOUS at 17:48

## 2024-08-18 RX ADMIN — SUCRALFATE 1 G: 1 SUSPENSION ORAL at 18:24

## 2024-08-18 RX ADMIN — SODIUM CHLORIDE 1000 ML: 9 INJECTION, SOLUTION INTRAVENOUS at 17:49

## 2024-08-18 ASSESSMENT — ACTIVITIES OF DAILY LIVING (ADL)
ADLS_ACUITY_SCORE: 35
ADLS_ACUITY_SCORE: 35

## 2024-08-18 ASSESSMENT — COLUMBIA-SUICIDE SEVERITY RATING SCALE - C-SSRS
2. HAVE YOU ACTUALLY HAD ANY THOUGHTS OF KILLING YOURSELF IN THE PAST MONTH?: NO
6. HAVE YOU EVER DONE ANYTHING, STARTED TO DO ANYTHING, OR PREPARED TO DO ANYTHING TO END YOUR LIFE?: NO
1. IN THE PAST MONTH, HAVE YOU WISHED YOU WERE DEAD OR WISHED YOU COULD GO TO SLEEP AND NOT WAKE UP?: NO

## 2024-08-18 NOTE — ED PROVIDER NOTES
Emergency Department Encounter   NAME: Ace Chavira  AGE: 24 year old male  YOB: 2000  MRN: [id number]    PCP: [facility] - [place]  ED PROVIDER: Amanda Piña PA-C    Evaluation Date & Time:   8/18/2024  5:28 PM    CHIEF COMPLAINT:  Emesis and Abdominal Pain      Impression and Plan   MDM: 23 yo M with a history of GERD presents for evaluation of vomiting and abdominal pain for the last three days. Generalized fatigue and limited oral intake. On arrival here patient is vitally stable. Afebrile. On my examination patient is in no acute distress. Mild epigastric tenderness to palpation without peritoneal signs. Overall  benign abdominal exam. Differential includes gastritis, COVID, viral syndrome, cholecystitis, pancreatitis. Exam is not consistent with obstruction, mesenteric ischemia, appendicitis. We discussed plans for labs, fluids, Zofran, and Carafate which patient is agreeable to.     Overall reassuring workup.  No leukocytosis concerning for ongoing systemic infection.  No electrolyte abnormality, acidosis, ASHISH.  Normal LFTs, hepatobiliary obstruction is unlikely.  Normal lipase, pancreatitis is unlikely.  Negative COVID swab.    I reassessed the patient.  He is feeling much better after fluids, Carafate, and Zofran.  He is able to tolerate oral intake here.  We reviewed all lab results.  I suspect this is likely a viral gastritis causing his symptoms.  We reviewed the importance of adequate hydration.  I prescribed him Zofran to help control his nausea and vomiting.  I encouraged a bland diet until he is feeling better.  I encouraged close follow-up with his primary care doctor in the next week for ER follow-up.  We reviewed strict return precautions and patient was discharged home in stable condition.    I have staffed the patient with Dr. Wong, ED physician, who will evaluate the patient and agrees with all aspects of today's care.          Medical Decision Making  Obtained  supplemental history:Supplemental history obtained?: No  Reviewed external records: External records reviewed?: No  Care impacted by chronic illness:N/A  Care significantly affected by social determinants of health:N/A  Did you consider but not order tests?: Work up considered but not performed and documented in chart, if applicable  Did you interpret images independently?: Independent interpretation of ECG and images noted in documentation, when applicable.  Consultation discussion with other provider:Did you involve another provider (consultant, , pharmacy, etc.)?: No  Discharge. I prescribed additional prescription strength medication(s) as charted. N/A.   At the conclusion of the encounter I discussed the results of all the tests and the disposition. The questions were answered. The patient or family acknowledged understanding and was agreeable with the care plan.      FINAL IMPRESSION:    ICD-10-CM    1. Nausea and vomiting  R11.2             MEDICATIONS GIVEN IN THE EMERGENCY DEPARTMENT:  Medications   sodium chloride 0.9% BOLUS 1,000 mL (0 mLs Intravenous Stopped 8/18/24 1825)   ondansetron (ZOFRAN) injection 4 mg (4 mg Intravenous $Given 8/18/24 1748)   sucralfate (CARAFATE) suspension 1 g (1 g Oral $Given 8/18/24 1824)         NEW PRESCRIPTIONS STARTED AT TODAY'S ED VISIT:  Discharge Medication List as of 8/18/2024  7:11 PM            HPI   Patient information was obtained from: patient   Use of Intrepreter: N/A     Ace Chavira is a 24 year old male with a pertinent history of GERD who presents to the ED by car for evaluation of nausea and vomiting. On Saturday night patient started feeling uneasy and nauseous.  He has had intermittent epigastric pain and 3-4 episodes of nonbloody emesis since Saturday.  Feeling generalized fatigue and has had limited oral intake due to feeling abdominal pain and increased nausea with oral intake.  He has also had a runny nose and notes that his mom had COVID last week.   "Though he took an at home test and that was negative.  He denies any fever, cough, chest pain, shortness of breath, diarrhea, dysuria.      REVIEW OF SYSTEMS:  Pertinent positive and negative symptoms per HPI.       Medical History     No past medical history on file.    No past surgical history on file.    Family History   Problem Relation Age of Onset    Gastrointestinal Disease Mother         h  pylori       Social History     Tobacco Use    Smoking status: Never     Passive exposure: Never    Smokeless tobacco: Never   Vaping Use    Vaping status: Never Used   Substance Use Topics    Alcohol use: Never    Drug use: Never       ondansetron (ZOFRAN ODT) 4 MG ODT tab  omeprazole (PRILOSEC) 40 MG DR capsule          Physical Exam     First Vitals:  Patient Vitals for the past 24 hrs:   BP Temp Temp src Pulse Resp SpO2 Height Weight   08/18/24 1901 106/55 -- -- 59 -- 100 % -- --   08/18/24 1846 115/71 -- -- 67 -- 100 % -- --   08/18/24 1831 109/63 -- -- 59 -- 100 % -- --   08/18/24 1800 108/64 -- -- 54 -- 100 % -- --   08/18/24 1714 117/68 99.1  F (37.3  C) Temporal 66 18 99 % 1.854 m (6' 1\") 69.4 kg (153 lb)       PHYSICAL EXAM:   General Appearance:  Alert, cooperative, no distress, appears stated age  Respiratory: No distress. Lungs clear to ausculation bilaterally. No wheezes, rhonchi or stridor  Cardiovascular: Regular rate and rhythm, no murmur. Normal cap refill. No peripheral edema  GI: mild epigastric tenderness to palpation without rigidity or guarding. Otherwise abdomen is soft and non tender.   : No CVA tenderness  Musculoskeletal: Moving all extremities. No gross deformities  Integument: Warm, dry, no rashes or lesions  Neurologic: Alert and orientated x3. Ambulating with a  smooth gait.  Psych: Normal mood and affect      Results     LAB:  All pertinent labs reviewed and interpreted  Labs Ordered and Resulted from Time of ED Arrival to Time of ED Departure   CBC WITH PLATELETS - Normal       Result " Value    WBC Count 8.1      RBC Count 4.74      Hemoglobin 14.3      Hematocrit 41.5      MCV 88      MCH 30.2      MCHC 34.5      RDW 12.1      Platelet Count 236     COMPREHENSIVE METABOLIC PANEL - Normal    Sodium 144      Potassium 3.7      Carbon Dioxide (CO2) 27      Anion Gap 12      Urea Nitrogen 16.4      Creatinine 0.97      GFR Estimate >90      Calcium 8.9      Chloride 105      Glucose 97      Alkaline Phosphatase 85      AST 23      ALT 11      Protein Total 7.0      Albumin 4.7      Bilirubin Total 0.7     LIPASE - Normal    Lipase 20     INFLUENZA A/B, RSV, & SARS-COV2 PCR - Normal    Influenza A PCR Negative      Influenza B PCR Negative      RSV PCR Negative      SARS CoV2 PCR Negative         RADIOLOGY:  No orders to display         Amanda Piña PA-C   Emergency Medicine   [facility] EMERGENCY ROOM       Amanda Piña PA-C  08/18/24 2042

## 2024-08-18 NOTE — ED TRIAGE NOTES
Pt presents to the ED with c/o generalized abdominal pain along with nausea and vomiting for the past 2-3 days. Denies fevers.

## 2024-08-18 NOTE — ED PROVIDER NOTES
"Emergency Department Midlevel Supervisory Note     I had a face to face encounter with this patient seen by the Advanced Practice Provider ([facility]). I personally made/approved the management plan and take responsibility for the patient management. I personally saw patient and performed a substantive portion of the visit including all aspects of the medical decision making.     ED Course:  5:44 PM Amanda Piña PA-C staffed patient with me. I agree with their assessment and plan of management, and I will see the patient.  7:05 PM I met with the patient to introduce myself, gather additional history, perform my initial exam, and discuss the plan.     Brief HPI:     Ace Chavira is a 24 year old male who presents for evaluation of emesis and abdominal pain. Since yesterday, the patient has been experiencing fatigue, intermittent epigastric pain, vomiting, and decreased oral intake. He denies fever, but notes some congestion. His mother had covid last week, but when he took a home covid test it was negative.     I, Josh Walton, am serving as a scribe to document services personally performed by Holden Wong MD, based on my observations and the provider's statements to me.   I, Holden Wong MD, attest that Josh Walton was acting in a scribe capacity, has observed my performance of the services and has documented them in accordance with my direction.    Brief Physical Exam: /68   Pulse 66   Temp 99.1  F (37.3  C) (Temporal)   Resp 18   Ht 1.854 m (6' 1\")   Wt 69.4 kg (153 lb)   SpO2 99%   BMI 20.19 kg/m    Constitutional:  Alert, in no acute distress  EYES: Conjunctivae clear  HENT:  Atraumatic  Respiratory:  Respirations even, unlabored, in no acute respiratory distress  Cardiovascular:  Regular rate and rhythm, good peripheral perfusion  GI: Soft, non-distended, non-tender  Musculoskeletal:  Moves all 4 extremities equally, grossly symmetrical strength  Integument: Warm & dry. No appreciable rash, " erythema.  Neurologic:  Alert & oriented, speech clear and fluent, no focal deficits noted  Psych: Normal mood and affect       MDM:  Patient evaluated for gastrointestinal symptoms.  He reports vomiting and epigastric pain.  My exam he does not appear ill.  He has no epigastric tenderness.  He was given symptomatic treatment and felt much better.  Laboratory investigation is normal.  No evidence for appendicitis.  No right lower quadrant tenderness.  Patient feels much better at this time and is able to tolerate oral intake.  He will be discharged home with symptomatic medication       No diagnosis found.      Labs and Imaging:       I have reviewed the relevant laboratory studies above.      Procedures:  I was present for the key portions of procedures documented in [facility]/midlevel note, see midlevel note for further details.    Holden Wong MD  [facility] EMERGENCY ROOM  [address] [id number] [phone]       Holden Wong MD  08/18/24 [id number]

## 2024-08-18 NOTE — DISCHARGE INSTRUCTIONS
You were seen in the emergency department for evaluation of nausea and vomiting. Thankfully, no sign of problems with the liver/gallbladder/pancreas or severe dehydration. You do not have COVID, flu, or RSV.  I suspect you likely have a viral infection that is causing your nausea and vomiting.  I prescribed you a medication called Zofran to help with the nausea and vomiting.    Ensure that you are staying well-hydrated.  If you are not eating much, please be sure to add an source of electrolytes with something like Gatorade, Powerade, liquid IV, etc.    Return to the emergency department for uncontrollable pain, vomiting blood, fever, or any other concerning symptoms.

## 2024-08-19 ENCOUNTER — PATIENT OUTREACH (OUTPATIENT)
Dept: INTERNAL MEDICINE | Facility: CLINIC | Age: 24
End: 2024-08-19

## 2024-12-04 ENCOUNTER — HOSPITAL ENCOUNTER (EMERGENCY)
Facility: CLINIC | Age: 24
Discharge: LEFT BEFORE DISCHARGE | End: 2024-12-04
Admitting: EMERGENCY MEDICINE
Payer: MEDICAID

## 2024-12-04 VITALS
HEART RATE: 53 BPM | SYSTOLIC BLOOD PRESSURE: 108 MMHG | DIASTOLIC BLOOD PRESSURE: 60 MMHG | RESPIRATION RATE: 18 BRPM | OXYGEN SATURATION: 98 % | BODY MASS INDEX: 19.22 KG/M2 | WEIGHT: 145 LBS | HEIGHT: 73 IN | TEMPERATURE: 97.5 F

## 2024-12-04 PROCEDURE — 250N000011 HC RX IP 250 OP 636: Performed by: EMERGENCY MEDICINE

## 2024-12-04 PROCEDURE — 99281 EMR DPT VST MAYX REQ PHY/QHP: CPT

## 2024-12-04 RX ORDER — ONDANSETRON 4 MG/1
4 TABLET, ORALLY DISINTEGRATING ORAL ONCE
Status: COMPLETED | OUTPATIENT
Start: 2024-12-04 | End: 2024-12-04

## 2024-12-04 RX ADMIN — ONDANSETRON 4 MG: 4 TABLET, ORALLY DISINTEGRATING ORAL at 16:49

## 2024-12-04 ASSESSMENT — COLUMBIA-SUICIDE SEVERITY RATING SCALE - C-SSRS
1. IN THE PAST MONTH, HAVE YOU WISHED YOU WERE DEAD OR WISHED YOU COULD GO TO SLEEP AND NOT WAKE UP?: NO
2. HAVE YOU ACTUALLY HAD ANY THOUGHTS OF KILLING YOURSELF IN THE PAST MONTH?: NO
6. HAVE YOU EVER DONE ANYTHING, STARTED TO DO ANYTHING, OR PREPARED TO DO ANYTHING TO END YOUR LIFE?: NO

## 2024-12-04 ASSESSMENT — ACTIVITIES OF DAILY LIVING (ADL)
ADLS_ACUITY_SCORE: 41

## 2025-05-11 ENCOUNTER — HEALTH MAINTENANCE LETTER (OUTPATIENT)
Age: 25
End: 2025-05-11